# Patient Record
Sex: MALE | Race: WHITE | NOT HISPANIC OR LATINO | Employment: FULL TIME | URBAN - METROPOLITAN AREA
[De-identification: names, ages, dates, MRNs, and addresses within clinical notes are randomized per-mention and may not be internally consistent; named-entity substitution may affect disease eponyms.]

---

## 2022-12-20 ENCOUNTER — OFFICE VISIT (OUTPATIENT)
Dept: RHEUMATOLOGY | Facility: CLINIC | Age: 52
End: 2022-12-20
Payer: COMMERCIAL

## 2022-12-20 VITALS
HEIGHT: 70 IN | RESPIRATION RATE: 16 BRPM | OXYGEN SATURATION: 98 % | BODY MASS INDEX: 32.04 KG/M2 | SYSTOLIC BLOOD PRESSURE: 144 MMHG | HEART RATE: 73 BPM | WEIGHT: 223.81 LBS | TEMPERATURE: 98 F | DIASTOLIC BLOOD PRESSURE: 97 MMHG

## 2022-12-20 DIAGNOSIS — Z79.899 HIGH RISK MEDICATION USE: ICD-10-CM

## 2022-12-20 DIAGNOSIS — M79.605 PAIN IN BOTH LOWER EXTREMITIES: ICD-10-CM

## 2022-12-20 DIAGNOSIS — Z15.89 HLA B27 (HLA B27 POSITIVE): ICD-10-CM

## 2022-12-20 DIAGNOSIS — R03.0 ELEVATED BP WITHOUT DIAGNOSIS OF HYPERTENSION: ICD-10-CM

## 2022-12-20 DIAGNOSIS — M54.31 SCIATICA OF RIGHT SIDE: ICD-10-CM

## 2022-12-20 DIAGNOSIS — M47.816 OSTEOARTHRITIS OF LUMBAR SPINE, UNSPECIFIED SPINAL OSTEOARTHRITIS COMPLICATION STATUS: ICD-10-CM

## 2022-12-20 DIAGNOSIS — M45.7 ANKYLOSING SPONDYLITIS OF LUMBOSACRAL REGION: Primary | ICD-10-CM

## 2022-12-20 DIAGNOSIS — M79.604 PAIN IN BOTH LOWER EXTREMITIES: ICD-10-CM

## 2022-12-20 PROBLEM — I10 ESSENTIAL HYPERTENSION: Status: ACTIVE | Noted: 2019-02-07

## 2022-12-20 PROBLEM — D84.821 DRUG-INDUCED IMMUNODEFICIENCY: Status: ACTIVE | Noted: 2022-12-20

## 2022-12-20 PROBLEM — I10 ESSENTIAL HYPERTENSION: Status: RESOLVED | Noted: 2019-02-07 | Resolved: 2022-12-20

## 2022-12-20 PROCEDURE — 3008F PR BODY MASS INDEX (BMI) DOCUMENTED: ICD-10-PCS | Mod: CPTII,,, | Performed by: INTERNAL MEDICINE

## 2022-12-20 PROCEDURE — 1160F RVW MEDS BY RX/DR IN RCRD: CPT | Mod: CPTII,,, | Performed by: INTERNAL MEDICINE

## 2022-12-20 PROCEDURE — 99204 PR OFFICE/OUTPT VISIT, NEW, LEVL IV, 45-59 MIN: ICD-10-PCS | Mod: S$PBB,,, | Performed by: INTERNAL MEDICINE

## 2022-12-20 PROCEDURE — 99204 OFFICE O/P NEW MOD 45 MIN: CPT | Mod: S$PBB,,, | Performed by: INTERNAL MEDICINE

## 2022-12-20 PROCEDURE — 1159F PR MEDICATION LIST DOCUMENTED IN MEDICAL RECORD: ICD-10-PCS | Mod: CPTII,,, | Performed by: INTERNAL MEDICINE

## 2022-12-20 PROCEDURE — 3008F BODY MASS INDEX DOCD: CPT | Mod: CPTII,,, | Performed by: INTERNAL MEDICINE

## 2022-12-20 PROCEDURE — 3077F SYST BP >= 140 MM HG: CPT | Mod: CPTII,,, | Performed by: INTERNAL MEDICINE

## 2022-12-20 PROCEDURE — 3080F PR MOST RECENT DIASTOLIC BLOOD PRESSURE >= 90 MM HG: ICD-10-PCS | Mod: CPTII,,, | Performed by: INTERNAL MEDICINE

## 2022-12-20 PROCEDURE — 1160F PR REVIEW ALL MEDS BY PRESCRIBER/CLIN PHARMACIST DOCUMENTED: ICD-10-PCS | Mod: CPTII,,, | Performed by: INTERNAL MEDICINE

## 2022-12-20 PROCEDURE — 3077F PR MOST RECENT SYSTOLIC BLOOD PRESSURE >= 140 MM HG: ICD-10-PCS | Mod: CPTII,,, | Performed by: INTERNAL MEDICINE

## 2022-12-20 PROCEDURE — 1159F MED LIST DOCD IN RCRD: CPT | Mod: CPTII,,, | Performed by: INTERNAL MEDICINE

## 2022-12-20 PROCEDURE — 99204 OFFICE O/P NEW MOD 45 MIN: CPT | Mod: PBBFAC | Performed by: INTERNAL MEDICINE

## 2022-12-20 PROCEDURE — 3080F DIAST BP >= 90 MM HG: CPT | Mod: CPTII,,, | Performed by: INTERNAL MEDICINE

## 2022-12-20 RX ORDER — CEFDINIR 300 MG/1
300 CAPSULE ORAL 2 TIMES DAILY
COMMUNITY
Start: 2022-10-10 | End: 2022-12-20 | Stop reason: ALTCHOICE

## 2022-12-20 RX ORDER — GABAPENTIN 300 MG/1
600 CAPSULE ORAL DAILY
Qty: 60 CAPSULE | Refills: 6 | Status: SHIPPED | OUTPATIENT
Start: 2022-12-20 | End: 2023-10-09

## 2022-12-20 RX ORDER — KETOROLAC TROMETHAMINE 10 MG/1
10 TABLET, FILM COATED ORAL EVERY 6 HOURS PRN
COMMUNITY
Start: 2022-10-10 | End: 2022-12-20 | Stop reason: ALTCHOICE

## 2022-12-20 RX ORDER — ADALIMUMAB 40MG/0.4ML
40 KIT SUBCUTANEOUS
COMMUNITY
Start: 2022-11-23 | End: 2022-12-20 | Stop reason: SDUPTHER

## 2022-12-20 RX ORDER — MELOXICAM 15 MG/1
15 TABLET ORAL
COMMUNITY
Start: 2022-12-01 | End: 2022-12-20 | Stop reason: SDUPTHER

## 2022-12-20 RX ORDER — ADALIMUMAB 40MG/0.4ML
40 KIT SUBCUTANEOUS
Qty: 2 PEN | Refills: 6 | Status: SHIPPED | OUTPATIENT
Start: 2022-12-20 | End: 2023-06-20 | Stop reason: SDUPTHER

## 2022-12-20 RX ORDER — GABAPENTIN 300 MG/1
600 CAPSULE ORAL DAILY
COMMUNITY
Start: 2022-12-17 | End: 2022-12-20 | Stop reason: SDUPTHER

## 2022-12-20 RX ORDER — ADALIMUMAB 40MG/0.8ML
40 KIT SUBCUTANEOUS
COMMUNITY
Start: 2022-04-18 | End: 2022-12-20 | Stop reason: SDUPTHER

## 2022-12-20 RX ORDER — MELOXICAM 15 MG/1
15 TABLET ORAL DAILY
Qty: 30 TABLET | Refills: 1 | Status: SHIPPED | OUTPATIENT
Start: 2022-12-20 | End: 2023-01-19

## 2022-12-20 NOTE — PROGRESS NOTES
Patient ID: [id number]     Chief Complaint: Follow-up (Legs have still be hurting.Could be both or one rather than the other. Right hurts more usually./Had an MRI recently. )      HPI:     Keron Lott is a 52 y.o. male here today for a new patient visit for follow up of ankylosing spondylitis.     He has AS, non radiographic, doing well with out any issues on Humira. H/o symptoms of inflammatory back pain, improved with Humira.   C/o pain in legs and stiffness. Back pain is better. Admits sciatica symptoms on right. Gabapentin helps with the symptoms, he takes 600 mg at night.   Taking Humira and it continues to help with back pain.   He had ruptured disc and had fusion of L4-5 in 2013. He did well after that for some time, for last 2-3 years he started having back pain again. Steroid injections are not helping that much. Back pain associated with pain in bilateral legs, worse on right. Leg pain is recent and worse. He is a  and had to drive because of these symptoms.   Continues to have issues with sciatica and bilateral leg pain, mild improvement with SHLOMO to lower back done by Dr. Nroris, scheduled for another injection in January 2023.  He is planning to see Dr. Anatoliy hall for second opinion.  He got dehydrated during COVID infection in 9/2022, he LOC and received IV fluids in ER and felt better.     Admits oral ulcers every couple of months, never had it in nose. No ulcers today.  Autoimmune diseases in family: Father also have back issues. Cousins have back issues.  Denies fevers, rashes, photosensitivity, history of DVT or PE, history of stroke or seizures, history of Ml, history of inflammatory eye diseases, history of malignancy, Raynaud's phenomenon.  Smoking: life time non smoker.    Smoking:   Social History     Tobacco Use   Smoking Status Never   Smokeless Tobacco Never          No past medical history on file.     Past Surgical History:   Procedure Laterality Date    BACK SURGERY   2013    L4 and L5 fusion       Review of patient's allergies indicates:  No Known Allergies    Outpatient Medications Marked as Taking for the 12/20/22 encounter (Office Visit) with Anna Dukes MD   Medication Sig Dispense Refill    [DISCONTINUED] adalimumab (HUMIRA) 40 mg/0.8 mL SyKt injection Inject 40 mg into the skin.      [DISCONTINUED] gabapentin (NEURONTIN) 300 MG capsule Take 600 mg by mouth once daily.      [DISCONTINUED] meloxicam (MOBIC) 15 MG tablet Take 15 mg by mouth.         Social History     Socioeconomic History    Marital status:    Tobacco Use    Smoking status: Never    Smokeless tobacco: Never   Substance and Sexual Activity    Alcohol use: Yes     Comment: May be two beers a month        History reviewed. No pertinent family history.       There is no immunization history on file for this patient.    Patient Care Team:  Jonah Vences MD as PCP - General (Family Medicine)     Subjective:     ROS    Constitutional:  Denies chills. Denies fever. Denies night sweats. Denies weight loss.   Ophthalmology: Denies blurred vision. Denies dry eyes. Denies eye pain. Denies Itching and redness.   ENT: Denies oral ulcers. Denies epistaxis. Denies dry mouth. Denies swollen glands.   Endocrine: Denies diabetes. Denies thyroid Problems.   Respiratory: Denies cough. Denies shortness of breath. Denies shortness of breath with exertion. Denies hemoptysis.   Cardiovascular: Denies chest pain at rest. Denies chest pain with exertion. Denies palpitations.    Gastrointestinal: Denies abdominal pain. Denies diarrhea. Denies nausea. Denies vomiting. Denies hematemesis or hematochezia. Denies heartburn.  Genitourinary: Denies blood in urine.  Musculoskeletal: See HPI for details  Integumentary: Denies rash. Denies photosensitivity.   Peripheral Vascular: Denies Ulcers of hands and/or feet. Denies Cold extremities.   Neurologic: Denies dizziness. Denies headache.  Denies loss of strength. Denies numbness  "or tingling.   Psychiatric: Denies depression. Denies anxiety. Denies suicidal/homicidal ideations.      Objective:     BP (!) 144/97 (BP Location: Right arm, Patient Position: Sitting, BP Method: Medium (Automatic))   Pulse 73   Temp 97.8 °F (36.6 °C) (Oral)   Resp 16   Ht 5' 10" (1.778 m)   Wt 101.5 kg (223 lb 12.8 oz)   SpO2 98%   BMI 32.11 kg/m²     Physical Exam    General Appearance: alert, pleasant, in no acute distress.  Skin: Skin color, texture, turgor normal. No rashes or lesions.  Eyes:  extraocular movement intact (EOMI), pupils equal, round, reactive to light and accommodation, conjunctiva clear.  ENT: No oral or nasal ulcers.  Neck:  Neck supple. No adenopathy.   Lungs: CTA throughout without crackles, rhonchi, or wheezes.   Heart: RRR w/o murmurs.  No edema.  Abdomen: Soft, non-tender, no masses, rebound or guarding.  Neuro: Alert, oriented, CN II-XII GI, sensory and motor innervation intact.  Psych: Alert, oriented, normal eye contact.    Joint Exam:  DIPs, PIPs, MCPs: no pain on palpation, no swelling or redness, no nodules.  Wrists: no pain on palpation, no swelling or redness, good range of motion.  Elbows: no pain on palpation, no swelling or redness, good range of motion, no nodules.  Shoulders: no pain on palpation, no swelling or redness, good range of motion.  Back/Neck: no pain on palpation. Shonna's positive bilaterally in the past, modified schober's normal  Hips: no pain on palpation, good range of motion.  Knees: no pain on palpation, no swelling or redness, good range of motion.  Ankles: no pain on palpation, no swelling or redness, good range of motion.  Feet: no pain on palpation, no swelling or redness, no nodules.    Labs:     3/2020: HLAB27 positive. ESR, CRP normal. CBC ok. RF negative.  10/2020: Negative Hep B, Hep C, HIV and quantiferon tb.   8/12/22:  CBC, CMP okay.  GFR 69.  LFTs normal.  ESR, CRP normal.  Negative hepatitis-B, hepatitis-C, QuantiFERON TB.  No results " found for: WBC, HGB, HCT, PLT, ALT, AST, BUN, CREATININE, NA, K, CL, CO2, PROTEINUR, CRP, SEDRATE     Imagin: MRI of lumbar spine without contrast: Stable L4-L5 anterior and posterior fusion. Mild bulging at L5-S1 level.  10/2020: Chest X-ray normal. Normal X-ray of bilateral elbows, C spine, hips and SI joints. DJD of thoracic spine. Posterior hardware fixation at L4-5.     Assessment:       ICD-10-CM ICD-9-CM   1. Ankylosing spondylitis of lumbosacral region  M45.7 720.0   2. Osteoarthritis of lumbar spine, unspecified spinal osteoarthritis complication status  M47.816 721.3   3. Sciatica of right side  M54.31 724.3   4. High risk medication use  Z79.899 V58.69   5. Elevated BP without diagnosis of hypertension  R03.0 796.2   6. Pain in both lower extremities  M79.604 729.5    M79.605         Plan:     1. Ankylosing spondylitis of lumbosacral region: P/w features of inflammation back pain. Synovitis in bilateral elbows on exam, Shonna's test positive bilaterally. He has ankylosing spondylitis. Admits intermittent oral ulcers. Denies history of genital ulcers or nasal ulcers, inflammatory eye disease symptoms or inflammatory bowel disease symptoms.   - Non radiographic ankylosing spondylitis of lumbar spine.  HLAB27 positive. Tried and failed Ibuprofen and Naproxen. Failed Cimzia, severe sinus issues with it. Patient felt better on Taltz, but insurance did not approve it. Cosentyx-did no help at all.   - Doing well on Humira.  Back pain and back stiffness is much better.  Only taking meloxicam as needed.  - Labs today.     2. Osteoarthritis of lumbar spine: with steroid injection to lower back done by Dr. Norris, scheduled for another injection in 2023.  He is planning to see Dr. Anatoliy hall for second opinion.     3. Sciatica of right side: C/w stretches and exercises. See above.     4. High risk medication use:  - Persons with rheumatoid arthritis, lupus, psoriatic arthritis and other  autoimmune diseases are at increased risk of cardiovascular disease including heart attack and stroke. We recommend that all patients with these conditions have annual health maintenance exams including lipid measurements, blood pressure measurements, and smoking cessation counseling when applicable at their primary care provider's office.   -Advised to stay up-to-date on age appropriate vaccinations and malignancy screening.    - Uptodate on COVID vaccines. Refused flu vaccine today.     5. Elevated BP without diagnosis of hypertension: BP normal per patient at home, he will follow up with his PCP.     6. Low GFR with NSAID's: Advised to stay hydrated and avoid NSAID's. Will monitor.           Follow up in about 6 months (around 6/20/2023). In addition to their scheduled follow up, the patient has also been instructed to follow up on as needed basis.      Total time spent with patient and documentation is more than 50 minutes. All questions were answered to patient's satisfaction and patient verbalized understanding.

## 2022-12-21 ENCOUNTER — DOCUMENTATION ONLY (OUTPATIENT)
Dept: RHEUMATOLOGY | Facility: CLINIC | Age: 52
End: 2022-12-21
Payer: COMMERCIAL

## 2022-12-21 NOTE — PROGRESS NOTES
AmriksKeron JEFF  50 Y old Male, : 1970 Account Number: [id number]  [address] , [id number]  Home: [phone]  Guarantor: Oren Keron A Insurance: [employer] [facility]  PCP: Jonah Vences MD Referring: Archie Norris M.D. Appointment Facility[facility]  [place]    2020 Progress Notes: Anna Dukes MD      Current Medications  Taking  Meloxicam 15 MG Tablet 1 tablet Orally Once a day  Medication List reviewed and reconciled with the patient    Past Medical History  Medical History Verified..  Surgical History  back   Family History Father: alive Mother: alive  2 brother(s), 2 sister(s). 1 son(s) .  Social History  Tobacco Use:  Tobacco Use/Smoking Are you a nonsmoker  Drugs/Alcohol:  Do you drink alcohol?: Socially. Miscellaneous:  Caffeine: 4 cups a day of coffee/3 cokes aday.  Exercise: none. Housing: owns a home. Living with: spouse/son. Marital status: .  Occupation: Works full-time- .  Allergies  N.K.D.A.  Hospitalization/Major Diagnostic Procedure  Denies Past Hospitalization  Review of Systems  General/Constitutional:  Chills denies. Fatigue denies.  Fever denies. Night sweats denies. Sleep disturbance denies. Weight loss denies. Ophthalmologic:  Blurred vision denies. Change in  visual acuity denies. Dry eye denies. Eye Pain denies. Itching and redness denies. Red eye denies.    Reason for Appointment  1. pos HLA B27  History of Present Illness  Consult:  50 year old male presents with c/o Chief complaint positive HLA B27.  Referred by Dr. Archie Norris.  9.30.20. Rapid 3: 5.7.  He had ruptured disc and had fusion of L4-5 in 2013. He did well after that for some time, for last 2-3 years he started having back pain again. Steroid injections are not helping that much. Back pain associated with pain in bilateral legs, worse on right. Leg pain is recent and worse. He is a  and had to drive because of  these symptoms. Back pain first started even in his 20's too, got worse for last 1O years. Morning stiffness for 1-2 hours in back.  Good time for him is mid mornings. Some times wakes up with back pain.  Injections to sciatic joints had helped. Some times pain and stiffness in neck. No pain in other joints. Severe pain in back of thighs, stabbing pain.  Admits oral ulcers every couple of months, never had it in nose.  No ulcers today.  Autoimmune diseases in family: Father also have back issues.  Cousins have back issues.  Denies fevers, rashes, photosensitivity, history of DVT or PE, history of stroke or seizures, history of Ml, history of inflammatory eye diseases, history of malignancy, Raynaud's phenomenon.  Smoking: life time non smoker.  Vital Signs  Temp 98.1, HR 79 /min, /100 mm Hg, Repeat /98, Wt 212 lbs, BMI 31.30 Index, Ht 69 in, RR 20 /min, Ht-cm 175.26 cm,  Wt-kg 96.16 kg.  Examination  General Examination:  GENERAL APPEARANCE: alert, pleasant, in no acute distress    EYES: extraocular movement intact (EOMI), pupils equal, round, reactive to light and accommodation, conjunctiva clear.    ,!;;Lisa:  Oral ulcers denies. Epistaxis denies.  Swelling of ears or throat denies. Dry mouth denies. Swollen glands denies. Endocrine:  Diabetes denies. Thyroid problems denies.  Respiratory:  Cough denies. Hemoptysis denies. Shortness of breath denies. Shortness of breath with exertion denies.  Cardiovascular:  Chest pain denies. Chest pain with exertion denies. Fluid accumulation in the legs denies. Orthopnea denies.  Palpitations denies. Gastrointestinal:  Abdominal pain denies. Blood in  stool denies. Constipation denies. Decreased appetite denies.  Diarrhea denies. Difficulty  swallowing denies. Heartburn denies. Nausea denies. Vomiting denies.  Hematology:  Blood clots denies. Anemia denies.  Bleeding problems denies. Genitourinary:  Blood in urine denies.  MlJSClJ loskeletaI: As per HPI  "..  Peripheral Vascular:  Ulcers hands/feet denies. Cold extremities denies.  fill.o.:  Photo sensitivity denies. Rash denies.    Neurologic:  Difficulty speaking denies.  Dizziness denies. Headache denies. Loss of strength denies. Seizures denies. Stroke denies.  Tingling/Numbness denies. Psychiatric:  Patient denies anxiety, depressed  mood, suicidal thoughts.    Vnf"\L vf"\VI I I. VVt'clllll lllcl I"\, Ut'lllt' Ulcll clllU llcl cll Ul\.,;t'I .  NECK/THYROID: neck supple. SKIN: no rashes .  HEART: S1, S2 normal, regular rate and rhythm .  LUNGS: clear to auscultation bilaterally, no wheezes, rales, rhonchi.  ABDOMEN: soft, nontender, nondistended. EXTREMITIES: no clubbing, cyanosis, or edema. PERIPHERAL PULSES: 2+ posterior tibial.  NEUROLOGIC: alert and oriented, cranial nerves 2-12 grossly intact, motor strength normal upper and lower extremities, sensory exam intact.  PSYCH: alert, oriented, good eye contact.  Joint exam:  DIPs, PIPs, and MCPs no pain on palpation, no swelling or redness, no nodules.  WRISTS no pain on palpation, no swelling or redness, good range of motion .  ELBOWS synovial thickness in bilateral elbows, tednerness in left elbow.  SHOULDERS no pain on palpation, no swelling or redness, good range of motion .  BACK/NECK no pain on palpation. Shonna's positive bilaterally, modified schober's normal..  HIPS no pain on palpation, good range of motion.  KNEES no pain on palpation, no swelling or redness, good range of motion.  ANKLES no pain on palpation, no redness, good range of  motion.  FEET no pain on palpation, no redness, good range of motion.  Assessments  HLA-B27 positive - Z15.89 (Primary)  Inflammatory back pain - M54.89  Elbow pain, left - M25.522  Elbow pain, right - M25.521  Sciatic leg pain - M54.30  Elevated BP without diagnosis of hypertension - R03.0  Reviewed records from Dr. Archie Norris  Thank you for allowing me to participate in care of Mr. Keron Lott. 50-year-old pleasant "  gentleman here for evaluation of back pain and positive HLA-B27.  Inflammatory back pain: He has features of inflammation back pain. Synovitis in bilateral elbows on exam, Shonna's test positive bilaterally. He likely has ankylosing spondylitis. Admits intermittent oral ulcers. Denies history of genital ulcers or nasal ulcers, inflammatory eye disease symptoms or inflammatory bowel disease symptoms. We will check basic labs, x-ray SI joint, elbows, spine and hips. He has failed meloxicam, ibuprofen and naproxen. After test results and start him on Cimzia, discussed the side effects and benefits of medication with the patient. Continue with meloxicam for now.  Recommend doing physical therapy for chronic back and sciatica symptoms.  Elevated blood pressure without diagnosis of hypertension: Per patient blood pressure normal at home, blood pressure elevated during visits with specialists because of anxiety.  Educated to stay up to date on age appropriate vaccinations and cancer screenings.  3/2020: HLAB27 positive. ESR, CRP normal. CBC ok. RF negative. MRI of lumbar spine without contrast: Stable L4-L5 anterior and posterior fusion. Mild bulging at L5-S1 level.  Total time spent with patient is 70 minutes. More than 50% of time was spent in discussing diagnosis and treatment options. All questions were answered to patient's satisfaction and patient verbalized understanding.  Treatment  HLA-827 positive  Continue Meloxicam Tablet, 15 MG, 1 tablet, Orally, Once a day LAB: CBC w/ Auto Diff  Value Reference Range  RBC 4.84 4.10-5.90-  x10(6)/mcl  Abs Neut 4.1 1.5-6.9 -  x10(3)/mcl   Hct 44.2 39.0-50.0- %   Hgb 15.1 13.2-17.2 - gm/dl   Manual Diff? No    MCH 31.2 28.0-32.0 - pg   MCHC 34.2 33.0-36.0- gm/dl   MCV 91.3 78.0-100.0 - fl   MPV 9.7 7.4-10.4 - fl   Platelet 343 130-400 -  x10(3)/mcl   ROW 12.6 10.2-13.4 - %   WBC 6.5 4.0-10.0-  x10(3)/mcl   LAB: Comprehensive Metabolic Panel - CMP LAB: CRP  LAB: Hep B  "Core Ab Total[facility] LAB: Hep B Surface Ab QI[facility] LAB: Hep B Surface Ag w/Rflx-[facility]  LAB: Hep C Ab w/Rflx HCV RNA On PCR-[facility]472 LAB: Ouantiferon TB Gold (DSl)-[facility]73  LAB: Sedimentation Rate LAB: HIV1/2Ag/Ab4G Rfx-Q  IMAGING: X ray: SI joint, left IMAGING: X ray: SI joint, right IMAGING: XR Chest 2 Views IMAGING: XR Elbow Left 2 Views IMAGING: XR Elbow RiQht 2 Views IMAGING: XR Hip Left 2 Views IMAGING: XR Hip Right 2 Views    11v1A1.,a11\11.,a: AM  ;;;ip1ne [id number] 4  views IMAGING: XR Spine Lumbar 2 or 3 Views IMAGING: XR Spine Thoracic 2 Views  Notes: The patient has symptoms consistent with a spondyloarthropathy. Discussed clinical features of spondyloarthropathies including ankylosing spondylitis, reactive arthritis, inflammatory bowel disease-related arthritis, psoriatic arthritis. Discussed how these diseases are considered an inflammatory arthritis. These diseases have a predilection towards causing inflammation in the lower back along with other peripheral joints. Other symptoms can include eye problems, tendon problems, and other various autoimmune features. Treatment of spondyloarthropathies include immunosuppressants.      Discussed the use of anti-TNF agents in inflammatory autoimmune disease. TNF-alpha promotes the inflammatory response, which in turn causes many of the clinical problems associated with autoimmune disorders such as rheumatoid arthritis, ankylosing spondylitis, Crohn"s disease, psoriasis, hidradenitis suppurativa and refractory asthma. Inhibition of TNF-alpha can decrease inflammation and in turn stop or decrease joint damage. This inhibition of TNF-alpha can be achieved with a monoclonal antibody such as infliximab (Remicade), adalimumab (Humira), certolizumab pegol (Cimz ia), and golimumab (Simponi), or with a circulating receptor fusion protein such as etanercept (Enbrel). These agents can be used as mono-therapy or in combination with other " immunosuppressants. Side effects include infection, especially opportunistic infections such as tuberculosis, fungal infections and certain types of bacterial infections. , We discussed the need for prompt treatment of infections and risk of death if infections were left untreated. If the patient should have an infection that requires antibiotics, then in general, they should hold the biologic TNF antagonist until the antibiotics are finished and there is a clear tendency to resolution of the infection. lmmunosuppression, in general, is known to be associated with an increase risk of malignancy, particularly squamous cell cancers. Likewise, we recommend annual skin exams by dermatologist and all other age appropriate cancer screening tests. We discussed this at length and the patient verbalized an understanding.    Others  Notes: Discussed precautions about the COVID 19 virus and advised adherence to CDCs/governmental guidelines. Encouraged patient to continue to avoid large crowds, quarantine based on present guidelines, avoid people who are sick, wash hands frequently and avoid touching face and/or eyes, along with wearing a mask. Encouraged to continue present medication regimen unless otherwise notified. Encouraged to report fevers, or s/s of illness to PCP, ourselves, or advised to seek out testing/further medical evaluation wherever available. Discussed if noted severe or significant symptoms especially with persistent fevers, cough, or

## 2023-01-05 ENCOUNTER — DOCUMENTATION ONLY (OUTPATIENT)
Dept: RHEUMATOLOGY | Facility: CLINIC | Age: 53
End: 2023-01-05
Payer: COMMERCIAL

## 2023-06-20 ENCOUNTER — OFFICE VISIT (OUTPATIENT)
Dept: RHEUMATOLOGY | Facility: CLINIC | Age: 53
End: 2023-06-20
Payer: COMMERCIAL

## 2023-06-20 VITALS
HEIGHT: 69 IN | TEMPERATURE: 98 F | RESPIRATION RATE: 20 BRPM | OXYGEN SATURATION: 98 % | SYSTOLIC BLOOD PRESSURE: 111 MMHG | HEART RATE: 81 BPM | BODY MASS INDEX: 33.42 KG/M2 | WEIGHT: 225.63 LBS | DIASTOLIC BLOOD PRESSURE: 77 MMHG

## 2023-06-20 DIAGNOSIS — M54.31 SCIATICA OF RIGHT SIDE: ICD-10-CM

## 2023-06-20 DIAGNOSIS — R03.0 ELEVATED BP WITHOUT DIAGNOSIS OF HYPERTENSION: ICD-10-CM

## 2023-06-20 DIAGNOSIS — M79.605 PAIN IN BOTH LOWER EXTREMITIES: ICD-10-CM

## 2023-06-20 DIAGNOSIS — M79.604 PAIN IN BOTH LOWER EXTREMITIES: ICD-10-CM

## 2023-06-20 DIAGNOSIS — M45.7 ANKYLOSING SPONDYLITIS OF LUMBOSACRAL REGION: Primary | ICD-10-CM

## 2023-06-20 DIAGNOSIS — M47.816 OSTEOARTHRITIS OF LUMBAR SPINE, UNSPECIFIED SPINAL OSTEOARTHRITIS COMPLICATION STATUS: ICD-10-CM

## 2023-06-20 DIAGNOSIS — Z79.899 HIGH RISK MEDICATION USE: ICD-10-CM

## 2023-06-20 DIAGNOSIS — Z15.89 HLA B27 (HLA B27 POSITIVE): ICD-10-CM

## 2023-06-20 PROCEDURE — 3074F SYST BP LT 130 MM HG: CPT | Mod: CPTII,,, | Performed by: INTERNAL MEDICINE

## 2023-06-20 PROCEDURE — 1159F MED LIST DOCD IN RCRD: CPT | Mod: CPTII,,, | Performed by: INTERNAL MEDICINE

## 2023-06-20 PROCEDURE — 3008F BODY MASS INDEX DOCD: CPT | Mod: CPTII,,, | Performed by: INTERNAL MEDICINE

## 2023-06-20 PROCEDURE — 3078F PR MOST RECENT DIASTOLIC BLOOD PRESSURE < 80 MM HG: ICD-10-PCS | Mod: CPTII,,, | Performed by: INTERNAL MEDICINE

## 2023-06-20 PROCEDURE — 99214 OFFICE O/P EST MOD 30 MIN: CPT | Mod: PBBFAC | Performed by: INTERNAL MEDICINE

## 2023-06-20 PROCEDURE — 1159F PR MEDICATION LIST DOCUMENTED IN MEDICAL RECORD: ICD-10-PCS | Mod: CPTII,,, | Performed by: INTERNAL MEDICINE

## 2023-06-20 PROCEDURE — 3008F PR BODY MASS INDEX (BMI) DOCUMENTED: ICD-10-PCS | Mod: CPTII,,, | Performed by: INTERNAL MEDICINE

## 2023-06-20 PROCEDURE — 99214 OFFICE O/P EST MOD 30 MIN: CPT | Mod: S$PBB,,, | Performed by: INTERNAL MEDICINE

## 2023-06-20 PROCEDURE — 99214 PR OFFICE/OUTPT VISIT, EST, LEVL IV, 30-39 MIN: ICD-10-PCS | Mod: S$PBB,,, | Performed by: INTERNAL MEDICINE

## 2023-06-20 PROCEDURE — 3074F PR MOST RECENT SYSTOLIC BLOOD PRESSURE < 130 MM HG: ICD-10-PCS | Mod: CPTII,,, | Performed by: INTERNAL MEDICINE

## 2023-06-20 PROCEDURE — 3078F DIAST BP <80 MM HG: CPT | Mod: CPTII,,, | Performed by: INTERNAL MEDICINE

## 2023-06-20 RX ORDER — MELOXICAM 7.5 MG/1
15 TABLET ORAL DAILY
COMMUNITY
End: 2023-08-07 | Stop reason: SDUPTHER

## 2023-06-20 RX ORDER — ADALIMUMAB 40MG/0.4ML
40 KIT SUBCUTANEOUS
Qty: 2 PEN | Refills: 6 | Status: SHIPPED | OUTPATIENT
Start: 2023-06-20 | End: 2024-01-17

## 2023-06-20 NOTE — PROGRESS NOTES
Patient ID: [id number]     Chief Complaint: [facility]W Follow Up Visit (Pt states he is feeling okay today but is still having pain in legs. )      HPI:     Keron Lott is a 53 y.o. male here today for follow up of ankylosing spondylitis.     He has AS, non radiographic, doing well with out any issues on Humira. H/o symptoms of inflammatory back pain, improved with Humira.   C/o pain in legs and stiffness. Back pain is better. Admits sciatica symptoms on right and numbness in left leg. Gabapentin helps with the symptoms, he takes 600 mg at night.   Taking Humira and it continues to help with back pain.   He had ruptured disc and had fusion of L4-5 in 2013. He did well after that for some time, for last 2-3 years he started having back pain again. Steroid injections are not helping that much. Back pain associated with pain in bilateral legs, worse on right. Leg pain is recent and worse. He is a  and had to drive because of these symptoms.   Continues to have issues with sciatica and bilateral leg pain, mild improvement with SHLOMO to lower back done by Dr. Norris.  He saw Dr. Anatoliy hall, repeat back injections did not help, was told L5-S1 disc is bulging on nerves, he is planning to see Dr Robbin Rodas in [place] soon, to see if he is a candidate for surgery again.     Admits oral ulcers every couple of months, never had it in nose. No ulcers today.  Autoimmune diseases in family: Father also have back issues. Cousins have back issues.  Denies fevers, rashes, photosensitivity, history of DVT or PE, history of stroke or seizures, history of Ml, history of inflammatory eye diseases, history of malignancy, Raynaud's phenomenon.  Smoking: life time non smoker.       Social History     Tobacco Use   Smoking Status Never   Smokeless Tobacco Former          No past medical history on file.     Past Surgical History:   Procedure Laterality Date    BACK SURGERY  2013    L4 and L5 fusion       Review of  patient's allergies indicates:  No Known Allergies    No outpatient medications have been marked as taking for the 6/20/23 encounter (Office Visit) with Anna Dukes MD.       Social History     Socioeconomic History    Marital status:    Tobacco Use    Smoking status: Never    Smokeless tobacco: Former   Substance and Sexual Activity    Alcohol use: Yes     Comment: May be two beers a month    Drug use: Never    Sexual activity: Yes        Family History   Problem Relation Age of Onset    Heart disease Father     Hypertension Father           There is no immunization history on file for this patient.    Patient Care Team:  Jonah Vences MD as PCP - General (Family Medicine)     Subjective:     ROS    Constitutional:  Denies chills. Denies fever. Denies night sweats. Denies weight loss.   Ophthalmology: Denies blurred vision. Denies dry eyes. Denies eye pain. Denies Itching and redness.   ENT: Denies oral ulcers. Denies epistaxis. Denies dry mouth. Denies swollen glands.   Endocrine: Denies diabetes. Denies thyroid Problems.   Respiratory: Denies cough. Denies shortness of breath. Denies shortness of breath with exertion. Denies hemoptysis.   Cardiovascular: Denies chest pain at rest. Denies chest pain with exertion. Denies palpitations.    Gastrointestinal: Denies abdominal pain. Denies diarrhea. Denies nausea. Denies vomiting. Denies hematemesis or hematochezia. Denies heartburn.  Genitourinary: Denies blood in urine.  Musculoskeletal: See HPI for details  Integumentary: Denies rash. Denies photosensitivity.   Peripheral Vascular: Denies Ulcers of hands and/or feet. Denies Cold extremities.   Neurologic: Denies dizziness. Denies headache.  Denies loss of strength. Denies numbness or tingling.   Psychiatric: Denies depression. Denies anxiety. Denies suicidal/homicidal ideations.      Objective:     /77 (BP Location: Right arm, Patient Position: Sitting, BP Method: Large (Automatic))   Pulse 81   " Temp 98.2 °F (36.8 °C) (Oral)   Resp 20   Ht 5' 9" (1.753 m)   Wt 102.3 kg (225 lb 9.6 oz)   SpO2 98%   BMI 33.32 kg/m²     Physical Exam    General Appearance: alert, pleasant, in no acute distress.  Skin: Skin color, texture, turgor normal. No rashes or lesions.  Eyes:  extraocular movement intact (EOMI), pupils equal, round, reactive to light and accommodation, conjunctiva clear.  ENT: No oral or nasal ulcers.  Neck:  Neck supple. No adenopathy.   Lungs: CTA throughout without crackles, rhonchi, or wheezes.   Heart: RRR w/o murmurs.  No edema.  Abdomen: Soft, non-tender, no masses, rebound or guarding.  Neuro: Alert, oriented, CN II-XII GI, sensory and motor innervation intact.  Psych: Alert, oriented, normal eye contact.    Joint Exam:  DIPs, PIPs, MCPs: no pain on palpation, no swelling or redness, no nodules.  Wrists: no pain on palpation, no swelling or redness, good range of motion.  Elbows: no pain on palpation, no swelling or redness, good range of motion, no nodules.  Shoulders: no pain on palpation, no swelling or redness, good range of motion.  Back/Neck: no pain on palpation. Shonna's positive bilaterally in the past, modified schober's normal  Ankles: no pain on palpation, no swelling or redness, good range of motion.  Feet: no pain on palpation, no swelling or redness, no nodules.    Labs:     3/2020: HLAB27 positive. ESR, CRP normal. CBC ok. RF negative.  10/2020: Negative Hep B, Hep C, HIV and quantiferon tb.   8/12/22:  CBC, CMP okay.  GFR 69.  LFTs normal.  ESR, CRP normal.  Negative hepatitis-B, hepatitis-C, QuantiFERON TB.  12/20/22:  CBC, CMP okay.  ESR, CRP normal.    Lab Results   Component Value Date    WBC 6.3 12/20/2022    HGB 14.6 12/20/2022    HCT 42.9 12/20/2022     12/20/2022    ALT 48 12/20/2022    AST 27 12/20/2022    BUN 15.7 12/20/2022    CREATININE 1.11 12/20/2022     12/20/2022    K 4.0 12/20/2022    CO2 27 12/20/2022    CRP 3.20 12/20/2022    SEDRATE 3 " 2022        Imagin: MRI of lumbar spine without contrast: Stable L4-L5 anterior and posterior fusion. Mild bulging at L5-S1 level.  10/2020: Chest X-ray normal. Normal X-ray of bilateral elbows, C spine, hips and SI joints. DJD of thoracic spine. Posterior hardware fixation at L4-5.     Assessment:       ICD-10-CM ICD-9-CM   1. Ankylosing spondylitis of lumbosacral region  M45.7 720.0   2. HLA B27 (HLA B27 positive)  Z15.89 V84.89   3. Osteoarthritis of lumbar spine, unspecified spinal osteoarthritis complication status  M47.816 721.3   4. Sciatica of right side  M54.31 724.3   5. High risk medication use  Z79.899 V58.69   6. Elevated BP without diagnosis of hypertension  R03.0 796.2   7. Pain in both lower extremities  M79.604 729.5    M79.605           Plan:     1. Ankylosing spondylitis of lumbosacral region: P/w features of inflammation back pain. Synovitis in bilateral elbows on exam, Shonna's test positive bilaterally. He has ankylosing spondylitis. Admits intermittent oral ulcers. Denies history of genital ulcers or nasal ulcers, inflammatory eye disease symptoms or inflammatory bowel disease symptoms.   - Non radiographic ankylosing spondylitis of lumbar spine. HLAB27 positive. Tried and failed Ibuprofen and Naproxen. Failed Cimzia, severe sinus issues with it. Patient felt better on Taltz, but insurance did not approve it. Cosentyx-did not help at all.   - Doing well on Humira.  Back pain and back stiffness is much better.  Only taking meloxicam as needed.  - Labs today.     2. Osteoarthritis of lumbar spine: He had seen Dr. Norris in the past. He saw Dr. Anatoliy hall, repeat back injections did not help, was told L5-S1 disc is bulging on nerves, he is planning to see Dr Robbin Rodas in [place] soon, to see if he is a candidate for surgery again.      3. Sciatica of right side: C/w stretches and exercises.      4. High risk medication use:  - Persons with rheumatoid arthritis,  lupus, psoriatic arthritis and other autoimmune diseases are at increased risk of cardiovascular disease including heart attack and stroke. We recommend that all patients with these conditions have annual health maintenance exams including lipid measurements, blood pressure measurements, and smoking cessation counseling when applicable at their primary care provider's office.   -Advised to stay up-to-date on age appropriate vaccinations and malignancy screening.    - Uptodate on COVID vaccines. Refused pneumonia vaccine today, he will think about it. Refused flu vaccine in the past.     5. Elevated BP without diagnosis of hypertension: BP normal per patient at home, he will follow up with his PCP.     6. Low GFR with NSAID's: Advised to stay hydrated and avoid NSAID's. Will monitor.           Follow up in about 6 months (around 12/20/2023). In addition to their scheduled follow up, the patient has also been instructed to follow up on as needed basis.      Total time spent with patient and documentation is more than 30 minutes. All questions were answered to patient's satisfaction and patient verbalized understanding.

## 2023-08-08 RX ORDER — MELOXICAM 7.5 MG/1
15 TABLET ORAL DAILY
Qty: 30 TABLET | Refills: 0 | Status: SHIPPED | OUTPATIENT
Start: 2023-08-08

## 2023-10-09 DIAGNOSIS — M45.7 ANKYLOSING SPONDYLITIS OF LUMBOSACRAL REGION: Primary | ICD-10-CM

## 2023-10-09 RX ORDER — GABAPENTIN 300 MG/1
600 CAPSULE ORAL DAILY
Qty: 60 CAPSULE | Refills: 6 | Status: SHIPPED | OUTPATIENT
Start: 2023-10-09 | End: 2024-03-11

## 2024-01-16 DIAGNOSIS — M45.7 ANKYLOSING SPONDYLITIS OF LUMBOSACRAL REGION: ICD-10-CM

## 2024-01-16 DIAGNOSIS — M45.7 ANKYLOSING SPONDYLITIS OF LUMBOSACRAL REGION: Primary | ICD-10-CM

## 2024-01-16 DIAGNOSIS — M47.816 OSTEOARTHRITIS OF LUMBAR SPINE, UNSPECIFIED SPINAL OSTEOARTHRITIS COMPLICATION STATUS: Primary | ICD-10-CM

## 2024-01-17 ENCOUNTER — LAB VISIT (OUTPATIENT)
Dept: LAB | Facility: HOSPITAL | Age: 54
End: 2024-01-17
Attending: INTERNAL MEDICINE
Payer: COMMERCIAL

## 2024-01-17 DIAGNOSIS — M45.7 ANKYLOSING SPONDYLITIS OF LUMBOSACRAL REGION: ICD-10-CM

## 2024-01-17 DIAGNOSIS — M47.816 OSTEOARTHRITIS OF LUMBAR SPINE, UNSPECIFIED SPINAL OSTEOARTHRITIS COMPLICATION STATUS: ICD-10-CM

## 2024-01-17 LAB
ALBUMIN SERPL-MCNC: 3.9 G/DL (ref 3.5–5)
ALBUMIN/GLOB SERPL: 1.5 RATIO (ref 1.1–2)
ALP SERPL-CCNC: 63 UNIT/L (ref 40–150)
ALT SERPL-CCNC: 29 UNIT/L (ref 0–55)
AST SERPL-CCNC: 17 UNIT/L (ref 5–34)
BASOPHILS # BLD AUTO: 0.06 X10(3)/MCL
BASOPHILS NFR BLD AUTO: 0.8 %
BILIRUB SERPL-MCNC: 0.7 MG/DL
BUN SERPL-MCNC: 16.6 MG/DL (ref 8.4–25.7)
CALCIUM SERPL-MCNC: 9.3 MG/DL (ref 8.4–10.2)
CHLORIDE SERPL-SCNC: 107 MMOL/L (ref 98–107)
CO2 SERPL-SCNC: 28 MMOL/L (ref 22–29)
CREAT SERPL-MCNC: 1.29 MG/DL (ref 0.73–1.18)
EOSINOPHIL # BLD AUTO: 0.03 X10(3)/MCL (ref 0–0.9)
EOSINOPHIL NFR BLD AUTO: 0.4 %
ERYTHROCYTE [DISTWIDTH] IN BLOOD BY AUTOMATED COUNT: 13 % (ref 11.5–17)
GFR SERPLBLD CREATININE-BSD FMLA CKD-EPI: >60 MLS/MIN/1.73/M2
GLOBULIN SER-MCNC: 2.6 GM/DL (ref 2.4–3.5)
GLUCOSE SERPL-MCNC: 85 MG/DL (ref 74–100)
HCT VFR BLD AUTO: 41.8 % (ref 42–52)
HGB BLD-MCNC: 14.3 G/DL (ref 14–18)
IMM GRANULOCYTES # BLD AUTO: 0.03 X10(3)/MCL (ref 0–0.04)
IMM GRANULOCYTES NFR BLD AUTO: 0.4 %
LYMPHOCYTES # BLD AUTO: 4.01 X10(3)/MCL (ref 0.6–4.6)
LYMPHOCYTES NFR BLD AUTO: 51 %
MCH RBC QN AUTO: 30.8 PG (ref 27–31)
MCHC RBC AUTO-ENTMCNC: 34.2 G/DL (ref 33–36)
MCV RBC AUTO: 90.1 FL (ref 80–94)
MONOCYTES # BLD AUTO: 0.6 X10(3)/MCL (ref 0.1–1.3)
MONOCYTES NFR BLD AUTO: 7.6 %
NEUTROPHILS # BLD AUTO: 3.14 X10(3)/MCL (ref 2.1–9.2)
NEUTROPHILS NFR BLD AUTO: 39.8 %
NRBC BLD AUTO-RTO: 0 %
PLATELET # BLD AUTO: 343 X10(3)/MCL (ref 130–400)
PMV BLD AUTO: 9.5 FL (ref 7.4–10.4)
POTASSIUM SERPL-SCNC: 4.4 MMOL/L (ref 3.5–5.1)
PROT SERPL-MCNC: 6.5 GM/DL (ref 6.4–8.3)
RBC # BLD AUTO: 4.64 X10(6)/MCL (ref 4.7–6.1)
SODIUM SERPL-SCNC: 143 MMOL/L (ref 136–145)
WBC # SPEC AUTO: 7.87 X10(3)/MCL (ref 4.5–11.5)

## 2024-01-17 PROCEDURE — 36415 COLL VENOUS BLD VENIPUNCTURE: CPT

## 2024-01-17 PROCEDURE — 80053 COMPREHEN METABOLIC PANEL: CPT

## 2024-01-17 PROCEDURE — 85025 COMPLETE CBC W/AUTO DIFF WBC: CPT

## 2024-01-17 RX ORDER — ADALIMUMAB 40MG/0.4ML
KIT SUBCUTANEOUS
Qty: 2 PEN | Refills: 6 | Status: SHIPPED | OUTPATIENT
Start: 2024-01-17 | End: 2024-02-20 | Stop reason: SDUPTHER

## 2024-01-17 NOTE — PROGRESS NOTES
Please let him know kidney function slightly lower than before, asked him to stay hydrated and decrease the use of meloxicam or other NSAIDs.  Ask him to take meloxicam as needed and try not to take it daily.

## 2024-02-16 ENCOUNTER — TELEPHONE (OUTPATIENT)
Dept: RHEUMATOLOGY | Facility: CLINIC | Age: 54
End: 2024-02-16
Payer: COMMERCIAL

## 2024-02-16 NOTE — TELEPHONE ENCOUNTER
Received PA request for Humira CF pen 40mg/0.4ml through cover my meds key: [id number] submitted to [facility]

## 2024-02-19 NOTE — TELEPHONE ENCOUNTER
Your information has been submitted to [facility]. To check for an updated outcome later, reopen this PA request from your dashboard.  If [facility] has not responded to your request within 24 hours, contact [facility] at [phone]. If you think there may be a problem with your PA request, use our live chat feature at the bottom right.

## 2024-02-19 NOTE — TELEPHONE ENCOUNTER
Humira 40mg PA approved from 2/19/24 to 2/19/25. Scanned in media. Faxed to approval to [facility] in [place].  And scanned fax confirmation. Called patient attempting to notify of humira approval but voicemail box is not set up.    Then called and spoke with patient to notify of prior authorization approval.Verbalized understanding.

## 2024-02-20 DIAGNOSIS — M45.7 ANKYLOSING SPONDYLITIS OF LUMBOSACRAL REGION: ICD-10-CM

## 2024-02-20 NOTE — TELEPHONE ENCOUNTER
Received fax from [facility] notifying this clinic that Humira Pen will not be covered by insurance and their new preferred is biosimilar humira- HyrimozPen. Dr. Dukes would like to send medication to [facility] pharm instead. Pended Humira and changed pharm.  Called patient to notify of pharmacy change.

## 2024-02-21 RX ORDER — ADALIMUMAB 40MG/0.4ML
40 KIT SUBCUTANEOUS
Qty: 2 PEN | Refills: 6 | Status: SHIPPED | OUTPATIENT
Start: 2024-02-21 | End: 2024-03-11 | Stop reason: ALTCHOICE

## 2024-03-11 ENCOUNTER — OFFICE VISIT (OUTPATIENT)
Dept: RHEUMATOLOGY | Facility: CLINIC | Age: 54
End: 2024-03-11
Payer: COMMERCIAL

## 2024-03-11 ENCOUNTER — TELEPHONE (OUTPATIENT)
Dept: RHEUMATOLOGY | Facility: CLINIC | Age: 54
End: 2024-03-11
Payer: COMMERCIAL

## 2024-03-11 ENCOUNTER — LAB VISIT (OUTPATIENT)
Dept: LAB | Facility: HOSPITAL | Age: 54
End: 2024-03-11
Attending: INTERNAL MEDICINE
Payer: COMMERCIAL

## 2024-03-11 VITALS
BODY MASS INDEX: 32.26 KG/M2 | TEMPERATURE: 98 F | HEART RATE: 76 BPM | WEIGHT: 217.81 LBS | SYSTOLIC BLOOD PRESSURE: 127 MMHG | DIASTOLIC BLOOD PRESSURE: 92 MMHG | HEIGHT: 69 IN | OXYGEN SATURATION: 96 % | RESPIRATION RATE: 18 BRPM

## 2024-03-11 DIAGNOSIS — Z23 NEED FOR SHINGLES VACCINE: ICD-10-CM

## 2024-03-11 DIAGNOSIS — M47.816 OSTEOARTHRITIS OF LUMBAR SPINE, UNSPECIFIED SPINAL OSTEOARTHRITIS COMPLICATION STATUS: ICD-10-CM

## 2024-03-11 DIAGNOSIS — Z79.899 DRUG-INDUCED IMMUNODEFICIENCY: ICD-10-CM

## 2024-03-11 DIAGNOSIS — M79.604 PAIN IN BOTH LOWER EXTREMITIES: ICD-10-CM

## 2024-03-11 DIAGNOSIS — Z15.89 HLA B27 (HLA B27 POSITIVE): ICD-10-CM

## 2024-03-11 DIAGNOSIS — M19.90 INFLAMMATORY ARTHRITIS: ICD-10-CM

## 2024-03-11 DIAGNOSIS — M79.605 PAIN IN BOTH LOWER EXTREMITIES: ICD-10-CM

## 2024-03-11 DIAGNOSIS — D84.821 DRUG-INDUCED IMMUNODEFICIENCY: ICD-10-CM

## 2024-03-11 DIAGNOSIS — M45.7 ANKYLOSING SPONDYLITIS OF LUMBOSACRAL REGION: Primary | ICD-10-CM

## 2024-03-11 LAB
ALBUMIN SERPL-MCNC: 4.4 G/DL (ref 3.5–5)
ALBUMIN/GLOB SERPL: 1.5 RATIO (ref 1.1–2)
ALP SERPL-CCNC: 64 UNIT/L (ref 40–150)
ALT SERPL-CCNC: 22 UNIT/L (ref 0–55)
AST SERPL-CCNC: 18 UNIT/L (ref 5–34)
BASOPHILS # BLD AUTO: 0.03 X10(3)/MCL
BASOPHILS NFR BLD AUTO: 0.5 %
BILIRUB SERPL-MCNC: 0.9 MG/DL
BUN SERPL-MCNC: 16.4 MG/DL (ref 8.4–25.7)
CALCIUM SERPL-MCNC: 9.7 MG/DL (ref 8.4–10.2)
CHLORIDE SERPL-SCNC: 107 MMOL/L (ref 98–107)
CO2 SERPL-SCNC: 29 MMOL/L (ref 22–29)
CREAT SERPL-MCNC: 1.11 MG/DL (ref 0.73–1.18)
EOSINOPHIL # BLD AUTO: 0.04 X10(3)/MCL (ref 0–0.9)
EOSINOPHIL NFR BLD AUTO: 0.6 %
ERYTHROCYTE [DISTWIDTH] IN BLOOD BY AUTOMATED COUNT: 12.2 % (ref 11.5–17)
GFR SERPLBLD CREATININE-BSD FMLA CKD-EPI: >60 MLS/MIN/1.73/M2
GLOBULIN SER-MCNC: 3 GM/DL (ref 2.4–3.5)
GLUCOSE SERPL-MCNC: 101 MG/DL (ref 74–100)
HBV CORE AB SERPL QL IA: NONREACTIVE
HBV SURFACE AB SER-ACNC: 0.32 MIU/ML
HBV SURFACE AB SERPL IA-ACNC: NONREACTIVE M[IU]/ML
HBV SURFACE AG SERPL QL IA: NONREACTIVE
HCT VFR BLD AUTO: 44.1 % (ref 42–52)
HCV AB SERPL QL IA: NONREACTIVE
HGB BLD-MCNC: 15.4 G/DL (ref 14–18)
IMM GRANULOCYTES # BLD AUTO: 0.03 X10(3)/MCL (ref 0–0.04)
IMM GRANULOCYTES NFR BLD AUTO: 0.5 %
LYMPHOCYTES # BLD AUTO: 1.74 X10(3)/MCL (ref 0.6–4.6)
LYMPHOCYTES NFR BLD AUTO: 27.9 %
MCH RBC QN AUTO: 31.2 PG (ref 27–31)
MCHC RBC AUTO-ENTMCNC: 34.9 G/DL (ref 33–36)
MCV RBC AUTO: 89.3 FL (ref 80–94)
MONOCYTES # BLD AUTO: 0.45 X10(3)/MCL (ref 0.1–1.3)
MONOCYTES NFR BLD AUTO: 7.2 %
NEUTROPHILS # BLD AUTO: 3.94 X10(3)/MCL (ref 2.1–9.2)
NEUTROPHILS NFR BLD AUTO: 63.3 %
NRBC BLD AUTO-RTO: 0 %
PLATELET # BLD AUTO: 326 X10(3)/MCL (ref 130–400)
PMV BLD AUTO: 9.3 FL (ref 7.4–10.4)
POTASSIUM SERPL-SCNC: 4.7 MMOL/L (ref 3.5–5.1)
PROT SERPL-MCNC: 7.4 GM/DL (ref 6.4–8.3)
RBC # BLD AUTO: 4.94 X10(6)/MCL (ref 4.7–6.1)
SODIUM SERPL-SCNC: 140 MMOL/L (ref 136–145)
WBC # SPEC AUTO: 6.23 X10(3)/MCL (ref 4.5–11.5)

## 2024-03-11 PROCEDURE — 80053 COMPREHEN METABOLIC PANEL: CPT

## 2024-03-11 PROCEDURE — 3008F BODY MASS INDEX DOCD: CPT | Mod: CPTII,,, | Performed by: INTERNAL MEDICINE

## 2024-03-11 PROCEDURE — 99214 OFFICE O/P EST MOD 30 MIN: CPT | Mod: PBBFAC,25 | Performed by: INTERNAL MEDICINE

## 2024-03-11 PROCEDURE — 86704 HEP B CORE ANTIBODY TOTAL: CPT

## 2024-03-11 PROCEDURE — 86803 HEPATITIS C AB TEST: CPT

## 2024-03-11 PROCEDURE — 86706 HEP B SURFACE ANTIBODY: CPT

## 2024-03-11 PROCEDURE — 1159F MED LIST DOCD IN RCRD: CPT | Mod: CPTII,,, | Performed by: INTERNAL MEDICINE

## 2024-03-11 PROCEDURE — 36415 COLL VENOUS BLD VENIPUNCTURE: CPT

## 2024-03-11 PROCEDURE — 86480 TB TEST CELL IMMUN MEASURE: CPT

## 2024-03-11 PROCEDURE — 85025 COMPLETE CBC W/AUTO DIFF WBC: CPT

## 2024-03-11 PROCEDURE — 87340 HEPATITIS B SURFACE AG IA: CPT

## 2024-03-11 PROCEDURE — 99215 OFFICE O/P EST HI 40 MIN: CPT | Mod: S$PBB,,, | Performed by: INTERNAL MEDICINE

## 2024-03-11 PROCEDURE — 3080F DIAST BP >= 90 MM HG: CPT | Mod: CPTII,,, | Performed by: INTERNAL MEDICINE

## 2024-03-11 PROCEDURE — 3074F SYST BP LT 130 MM HG: CPT | Mod: CPTII,,, | Performed by: INTERNAL MEDICINE

## 2024-03-11 PROCEDURE — 90750 HZV VACC RECOMBINANT IM: CPT | Mod: PBBFAC

## 2024-03-11 RX ORDER — UPADACITINIB 15 MG/1
15 TABLET, EXTENDED RELEASE ORAL DAILY
Qty: 30 TABLET | Refills: 11 | Status: ACTIVE | OUTPATIENT
Start: 2024-03-11

## 2024-03-11 NOTE — TELEPHONE ENCOUNTER
Started him on Rinvoq, sent it to [facility] as he needed it to be delivered, he lives in [place].     Please check with [facility] to make sure they are doing PA and patient gets the medication.

## 2024-03-11 NOTE — PROGRESS NOTES
Patient ID: [id number]     Chief Complaint: Ankylosing spondylitis of lumbosacral region (Pt states is having a lot of generalized joint pain.  Patient states he is mainly having pain to his lower legs. )      HPI:     Keron Lott is a 53 y.o. male here today for follow up of ankylosing spondylitis.     He has AS, non radiographic. H/o symptoms of inflammatory back pain.   He continues to take Humira 40 mg subcu every other week, slowly lose in response to Humira, its is not last lasting for full 14 days.  Complaining of pain in bilateral hands and joints in feet.  Admits swelling in hand joints.  C/o pain in legs and stiffness. Back pain is better. Admits sciatica symptoms on right and numbness in left leg. Gabapentin helps with the symptoms, he takes 600 mg at night.   Taking Humira and it continues to help with back pain.   He had ruptured disc and had fusion of L4-5 in 2013. He did well after that for some time, for last 2-3 years he started having back pain again. Steroid injections are not helping that much. Back pain associated with pain in bilateral legs, worse on right. Leg pain is recent and worse. He is a  and had to drive because of these symptoms.   Continues to have issues with sciatica and bilateral leg pain, mild improvement with SHLOMO to lower back done by Dr. Norris.  He saw Dr. Anatoliy hall, repeat back injections did not help, was told L5-S1 disc is bulging on nerves.    Admits oral ulcers every couple of months, never had it in nose. No ulcers today.  Autoimmune diseases in family: Father also have back issues. Cousins have back issues.  Denies fevers, rashes, photosensitivity, history of DVT or PE, history of stroke or seizures, history of Ml, history of inflammatory eye diseases, history of malignancy, Raynaud's phenomenon.  Smoking: life time non smoker.       Social History     Tobacco Use   Smoking Status Never   Smokeless Tobacco Former          No past medical history on  file.     Past Surgical History:   Procedure Laterality Date    BACK SURGERY  2013    L4 and L5 fusion       Review of patient's allergies indicates:  No Known Allergies    Outpatient Medications Marked as Taking for the 3/11/24 encounter (Office Visit) with Anna Dukes MD   Medication Sig Dispense Refill    gabapentin (NEURONTIN) 300 MG capsule TAKE 2 CAPSULES (600 MG TOTAL) BY MOUTH ONCE DAILY. 60 capsule 6    meloxicam (MOBIC) 7.5 MG tablet Take 2 tablets (15 mg total) by mouth once daily. 30 tablet 0    [DISCONTINUED] adalimumab (HUMIRA,CF, PEN) 40 mg/0.4 mL PnKt Inject 0.4 mLs (40 mg total) into the skin every 14 (fourteen) days. 2 Pen 6       Social History     Socioeconomic History    Marital status:    Tobacco Use    Smoking status: Never    Smokeless tobacco: Former   Substance and Sexual Activity    Alcohol use: Yes     Comment: May be two beers a month    Drug use: Never    Sexual activity: Yes        Family History   Problem Relation Age of Onset    Heart disease Father     Hypertension Father         Immunization History   Administered Date(s) Administered    COVID-19, MRNA, LN-S, PF (MODERNA FULL 0.5 ML DOSE) 07/24/2021, 08/21/2021    Influenza - Quadrivalent - PF *Preferred* (6 months and older) 10/04/2020    Tdap 10/28/2011, 10/09/2022    Zoster Recombinant 03/11/2024       Patient Care Team:  Jonah Vences MD as PCP - General (Family Medicine)     Subjective:     ROS    Constitutional:  Denies chills. Denies fever. Denies night sweats. Denies weight loss.   Ophthalmology: Denies blurred vision. Denies dry eyes. Denies eye pain. Denies Itching and redness.   ENT: Denies oral ulcers. Denies epistaxis. Denies dry mouth. Denies swollen glands.   Endocrine: Denies diabetes. Denies thyroid Problems.   Respiratory: Denies cough. Denies shortness of breath. Denies shortness of breath with exertion. Denies hemoptysis.   Cardiovascular: Denies chest pain at rest. Denies chest pain with  "exertion. Denies palpitations.    Gastrointestinal: Denies abdominal pain. Denies diarrhea. Denies nausea. Denies vomiting. Denies hematemesis or hematochezia. Denies heartburn.  Genitourinary: Denies blood in urine.  Musculoskeletal: See HPI for details  Integumentary: Denies rash. Denies photosensitivity.   Peripheral Vascular: Denies Ulcers of hands and/or feet. Denies Cold extremities.   Neurologic: Denies dizziness. Denies headache.  Denies loss of strength. Denies numbness or tingling.   Psychiatric: Denies depression. Denies anxiety. Denies suicidal/homicidal ideations.      Objective:     BP (!) 127/92 (BP Location: Left arm, Patient Position: Sitting, BP Method: Large (Automatic))   Pulse 76   Temp 97.9 °F (36.6 °C) (Oral)   Resp 18   Ht 5' 9" (1.753 m)   Wt 98.8 kg (217 lb 12.8 oz)   SpO2 96%   BMI 32.16 kg/m²     Physical Exam    General Appearance: alert, pleasant, in no acute distress.  Skin: Skin color, texture, turgor normal. No rashes or lesions.  Eyes:  extraocular movement intact (EOMI), pupils equal, round, reactive to light and accommodation, conjunctiva clear.  ENT: No oral or nasal ulcers.  Neck:  Neck supple. No adenopathy.   Lungs: CTA throughout without crackles, rhonchi, or wheezes.   Heart: RRR w/o murmurs.  No edema.  Abdomen: Soft, non-tender, no masses, rebound or guarding.  Neuro: Alert, oriented, CN II-XII GI, sensory and motor innervation intact.  Psych: Alert, oriented, normal eye contact.    Joint Exam:  DIPs, PIPs, MCPs:  Tenderness with palpation of right 3rd MCP with mild swelling and tenderness over 2nd and 3rd MCPs.  Wrists: no pain on palpation, no swelling or redness, good range of motion.  Elbows: no pain on palpation, no swelling or redness, good range of motion, no nodules.  Shoulders: no pain on palpation, no swelling or redness, good range of motion.  Back/Neck: no pain on palpation. Shonna's positive bilaterally in the past, modified schober's normal  Ankles: no " pain on palpation, no swelling or redness, good range of motion.  Feet: no pain on palpation, no swelling or redness, no nodules.    Labs:     3/2020: HLAB27 positive. ESR, CRP normal. CBC ok. RF negative.  10/2020: Negative Hep B, Hep C, HIV and quantiferon tb.   22:  CBC, CMP okay.  GFR 69.  LFTs normal.  ESR, CRP normal.  Negative hepatitis-B, hepatitis-C, QuantiFERON TB.  22:  CBC, CMP okay.  ESR, CRP normal.  2023; CBC, CMP okay.  2024; CBC okay.  Creatinine 1.29, GFR greater than 60.     Imagin: MRI of lumbar spine without contrast: Stable L4-L5 anterior and posterior fusion. Mild bulging at L5-S1 level.  10/2020: Chest X-ray normal. Normal X-ray of bilateral elbows, C spine, hips and SI joints. DJD of thoracic spine. Posterior hardware fixation at L4-5.     Assessment:       ICD-10-CM ICD-9-CM   1. Ankylosing spondylitis of lumbosacral region  M45.7 720.0   2. Inflammatory arthritis  M19.90 714.9   3. HLA B27 (HLA B27 positive)  Z15.89 V84.89   4. Pain in both lower extremities  M79.604 729.5    M79.605    5. Osteoarthritis of lumbar spine, unspecified spinal osteoarthritis complication status  M47.816 721.3   6. Drug-induced immunodeficiency  D84.821 279.3    Z79.899 E947.9   7. Need for shingles vaccine  Z23 V04.89            Plan:     1. Ankylosing spondylitis of lumbosacral region and inflammatory arthritis: P/w features of inflammation back pain. Synovitis in bilateral elbows on exam, Shonna's test positive bilaterally. He has ankylosing spondylitis. Admits intermittent oral ulcers. Denies history of genital ulcers or nasal ulcers, inflammatory eye disease symptoms or inflammatory bowel disease symptoms.   - Non radiographic ankylosing spondylitis of lumbar spine. HLAB27 positive. Tried and failed Ibuprofen and Naproxen. Failed Cimzia, severe sinus issues with it. Patient felt better on Taltz, but insurance did not approve it. Cosentyx-did not help at all.   - slowly dosing  response to Humira.  Stop Humira.  - mild synovitis in right hand on exam today.  - start him on Rinvoq 15 mg daily, discussed the risks and benefits of medication with the patient.  No cardiac history, no history of DVT or PE or stroke.  - Labs today and in 4 weeks after starting Rinvoq.     2. Osteoarthritis of lumbar spine: He had seen Dr. Norris in the past. He saw Dr. Anatoliy hall, repeat back injections did not help, was told L5-S1 disc is bulging on nerves, he saw Dr Robbin Rodas in [place], was told no intervention needed at this time.      3. Sciatica of right side: C/w stretches and exercises.      4. High risk medication use:  - Persons with rheumatoid arthritis, lupus, psoriatic arthritis and other autoimmune diseases are at increased risk of cardiovascular disease including heart attack and stroke. We recommend that all patients with these conditions have annual health maintenance exams including lipid measurements, blood pressure measurements, and smoking cessation counseling when applicable at their primary care provider's office.   -Advised to stay up-to-date on age appropriate vaccinations and malignancy screening.    - Uptodate on COVID vaccines. Refused pneumonia vaccine, he will think about it. Refused flu vaccine in the past.  - Singles vaccine 3/11/24.      5. Elevated BP without diagnosis of hypertension: BP normal per patient at home, he will follow up with his PCP.     6. Low GFR with NSAID's: Advised to stay hydrated and avoid NSAID's. Will monitor.           Follow up in about 3 months (around 6/11/2024) for 3-4 months with me. In addition to their scheduled follow up, the patient has also been instructed to follow up on as needed basis.      Total time spent with patient and documentation is 40 minutes. All questions were answered to patient's satisfaction and patient verbalized understanding.

## 2024-03-13 LAB
GAMMA INTERFERON BACKGROUND BLD IA-ACNC: 0.02 IU/ML
M TB IFN-G BLD-IMP: NEGATIVE
M TB IFN-G CD4+ BCKGRND COR BLD-ACNC: 0.01 IU/ML
M TB IFN-G CD4+CD8+ BCKGRND COR BLD-ACNC: 0.01 IU/ML
MITOGEN IGNF BCKGRD COR BLD-ACNC: >10 IU/ML

## 2024-03-21 ENCOUNTER — TELEPHONE (OUTPATIENT)
Dept: RHEUMATOLOGY | Facility: CLINIC | Age: 54
End: 2024-03-21
Payer: COMMERCIAL

## 2024-03-21 NOTE — TELEPHONE ENCOUNTER
----- Message from Ariana Roy sent at 3/21/2024  3:33 PM CDT -----  Regarding: Please advise  Pt wants to know when do he have to do blood work after he started the new meds? Please advise [phone]    Thank You

## 2024-03-22 DIAGNOSIS — M45.7 ANKYLOSING SPONDYLITIS OF LUMBOSACRAL REGION: Primary | ICD-10-CM

## 2024-04-17 ENCOUNTER — LAB VISIT (OUTPATIENT)
Dept: LAB | Facility: HOSPITAL | Age: 54
End: 2024-04-17
Attending: INTERNAL MEDICINE
Payer: COMMERCIAL

## 2024-04-17 DIAGNOSIS — M45.7 ANKYLOSING SPONDYLITIS OF LUMBOSACRAL REGION: ICD-10-CM

## 2024-04-17 LAB
ALBUMIN SERPL-MCNC: 4.1 G/DL (ref 3.5–5)
ALBUMIN/GLOB SERPL: 1.5 RATIO (ref 1.1–2)
ALP SERPL-CCNC: 54 UNIT/L (ref 40–150)
ALT SERPL-CCNC: 26 UNIT/L (ref 0–55)
AST SERPL-CCNC: 21 UNIT/L (ref 5–34)
BASOPHILS # BLD AUTO: 0.05 X10(3)/MCL
BASOPHILS NFR BLD AUTO: 0.8 %
BILIRUB SERPL-MCNC: 0.9 MG/DL
BUN SERPL-MCNC: 19.8 MG/DL (ref 8.4–25.7)
CALCIUM SERPL-MCNC: 8.9 MG/DL (ref 8.4–10.2)
CHLORIDE SERPL-SCNC: 106 MMOL/L (ref 98–107)
CO2 SERPL-SCNC: 25 MMOL/L (ref 22–29)
CREAT SERPL-MCNC: 1.37 MG/DL (ref 0.73–1.18)
EOSINOPHIL # BLD AUTO: 0.06 X10(3)/MCL (ref 0–0.9)
EOSINOPHIL NFR BLD AUTO: 0.9 %
ERYTHROCYTE [DISTWIDTH] IN BLOOD BY AUTOMATED COUNT: 12 % (ref 11.5–17)
GFR SERPLBLD CREATININE-BSD FMLA CKD-EPI: >60 MLS/MIN/1.73/M2
GLOBULIN SER-MCNC: 2.7 GM/DL (ref 2.4–3.5)
GLUCOSE SERPL-MCNC: 109 MG/DL (ref 74–100)
HCT VFR BLD AUTO: 41.2 % (ref 42–52)
HGB BLD-MCNC: 14.3 G/DL (ref 14–18)
IMM GRANULOCYTES # BLD AUTO: 0.02 X10(3)/MCL (ref 0–0.04)
IMM GRANULOCYTES NFR BLD AUTO: 0.3 %
LYMPHOCYTES # BLD AUTO: 3.26 X10(3)/MCL (ref 0.6–4.6)
LYMPHOCYTES NFR BLD AUTO: 51.6 %
MCH RBC QN AUTO: 30.4 PG (ref 27–31)
MCHC RBC AUTO-ENTMCNC: 34.7 G/DL (ref 33–36)
MCV RBC AUTO: 87.7 FL (ref 80–94)
MONOCYTES # BLD AUTO: 0.38 X10(3)/MCL (ref 0.1–1.3)
MONOCYTES NFR BLD AUTO: 6 %
NEUTROPHILS # BLD AUTO: 2.55 X10(3)/MCL (ref 2.1–9.2)
NEUTROPHILS NFR BLD AUTO: 40.4 %
NRBC BLD AUTO-RTO: 0 %
PLATELET # BLD AUTO: 344 X10(3)/MCL (ref 130–400)
PMV BLD AUTO: 9.1 FL (ref 7.4–10.4)
POTASSIUM SERPL-SCNC: 3.8 MMOL/L (ref 3.5–5.1)
PROT SERPL-MCNC: 6.8 GM/DL (ref 6.4–8.3)
RBC # BLD AUTO: 4.7 X10(6)/MCL (ref 4.7–6.1)
SODIUM SERPL-SCNC: 140 MMOL/L (ref 136–145)
WBC # SPEC AUTO: 6.32 X10(3)/MCL (ref 4.5–11.5)

## 2024-04-17 PROCEDURE — 36415 COLL VENOUS BLD VENIPUNCTURE: CPT

## 2024-04-17 PROCEDURE — 80053 COMPREHEN METABOLIC PANEL: CPT

## 2024-04-17 PROCEDURE — 85025 COMPLETE CBC W/AUTO DIFF WBC: CPT

## 2024-06-11 DIAGNOSIS — M45.7 ANKYLOSING SPONDYLITIS OF LUMBOSACRAL REGION: ICD-10-CM

## 2024-06-11 RX ORDER — GABAPENTIN 300 MG/1
600 CAPSULE ORAL DAILY
Qty: 60 CAPSULE | Refills: 6 | Status: SHIPPED | OUTPATIENT
Start: 2024-06-11

## 2024-07-15 ENCOUNTER — TELEPHONE (OUTPATIENT)
Dept: RHEUMATOLOGY | Facility: CLINIC | Age: 54
End: 2024-07-15
Payer: COMMERCIAL

## 2024-07-15 NOTE — TELEPHONE ENCOUNTER
Pt called because he has a UTI and is currently taking Cipro 500  BID 10 Days so he wants to know if he needs to stop taking his Rinvoq because of this.    Protopic Counseling: Patient may experience a mild burning sensation during topical application. Protopic is not approved in children less than 2 years of age. There have been case reports of hematologic and skin malignancies in patients using topical calcineurin inhibitors although causality is questionable.

## 2024-08-15 ENCOUNTER — LAB VISIT (OUTPATIENT)
Dept: LAB | Facility: HOSPITAL | Age: 54
End: 2024-08-15
Attending: INTERNAL MEDICINE
Payer: COMMERCIAL

## 2024-08-15 ENCOUNTER — OFFICE VISIT (OUTPATIENT)
Dept: RHEUMATOLOGY | Facility: CLINIC | Age: 54
End: 2024-08-15
Payer: COMMERCIAL

## 2024-08-15 VITALS
RESPIRATION RATE: 18 BRPM | SYSTOLIC BLOOD PRESSURE: 126 MMHG | OXYGEN SATURATION: 97 % | DIASTOLIC BLOOD PRESSURE: 83 MMHG | HEART RATE: 77 BPM | TEMPERATURE: 98 F | BODY MASS INDEX: 31.74 KG/M2 | HEIGHT: 69 IN | WEIGHT: 214.31 LBS

## 2024-08-15 DIAGNOSIS — M45.7 ANKYLOSING SPONDYLITIS OF LUMBOSACRAL REGION: Primary | ICD-10-CM

## 2024-08-15 DIAGNOSIS — Z15.89 HLA B27 (HLA B27 POSITIVE): ICD-10-CM

## 2024-08-15 DIAGNOSIS — Z79.899 DRUG-INDUCED IMMUNODEFICIENCY: ICD-10-CM

## 2024-08-15 DIAGNOSIS — M54.31 SCIATICA OF RIGHT SIDE: ICD-10-CM

## 2024-08-15 DIAGNOSIS — D84.821 DRUG-INDUCED IMMUNODEFICIENCY: ICD-10-CM

## 2024-08-15 DIAGNOSIS — M45.7 ANKYLOSING SPONDYLITIS OF LUMBOSACRAL REGION: ICD-10-CM

## 2024-08-15 DIAGNOSIS — M47.816 OSTEOARTHRITIS OF LUMBAR SPINE, UNSPECIFIED SPINAL OSTEOARTHRITIS COMPLICATION STATUS: ICD-10-CM

## 2024-08-15 DIAGNOSIS — M79.604 PAIN IN BOTH LOWER EXTREMITIES: ICD-10-CM

## 2024-08-15 DIAGNOSIS — M19.90 INFLAMMATORY ARTHRITIS: ICD-10-CM

## 2024-08-15 DIAGNOSIS — M79.605 PAIN IN BOTH LOWER EXTREMITIES: ICD-10-CM

## 2024-08-15 DIAGNOSIS — N18.2 STAGE 2 CHRONIC KIDNEY DISEASE: ICD-10-CM

## 2024-08-15 LAB
ALBUMIN SERPL-MCNC: 3.9 G/DL (ref 3.5–5)
ALBUMIN/GLOB SERPL: 1.3 RATIO (ref 1.1–2)
ALP SERPL-CCNC: 60 UNIT/L (ref 40–150)
ALT SERPL-CCNC: 16 UNIT/L (ref 0–55)
ANION GAP SERPL CALC-SCNC: 8 MEQ/L
AST SERPL-CCNC: 13 UNIT/L (ref 5–34)
BASOPHILS # BLD AUTO: 0.04 X10(3)/MCL
BASOPHILS NFR BLD AUTO: 0.7 %
BILIRUB SERPL-MCNC: 0.6 MG/DL
BUN SERPL-MCNC: 13.5 MG/DL (ref 8.4–25.7)
CALCIUM SERPL-MCNC: 10 MG/DL (ref 8.4–10.2)
CHLORIDE SERPL-SCNC: 106 MMOL/L (ref 98–107)
CO2 SERPL-SCNC: 27 MMOL/L (ref 22–29)
CREAT SERPL-MCNC: 1.12 MG/DL (ref 0.73–1.18)
CREAT/UREA NIT SERPL: 12
CRP SERPL-MCNC: 2.5 MG/L
EOSINOPHIL # BLD AUTO: 0.07 X10(3)/MCL (ref 0–0.9)
EOSINOPHIL NFR BLD AUTO: 1.2 %
ERYTHROCYTE [DISTWIDTH] IN BLOOD BY AUTOMATED COUNT: 12.6 % (ref 11.5–17)
ERYTHROCYTE [SEDIMENTATION RATE] IN BLOOD: 3 MM/HR (ref 0–15)
GFR SERPLBLD CREATININE-BSD FMLA CKD-EPI: >60 ML/MIN/1.73/M2
GLOBULIN SER-MCNC: 2.9 GM/DL (ref 2.4–3.5)
GLUCOSE SERPL-MCNC: 89 MG/DL (ref 74–100)
HCT VFR BLD AUTO: 38.5 % (ref 42–52)
HGB BLD-MCNC: 13.5 G/DL (ref 14–18)
IMM GRANULOCYTES # BLD AUTO: 0.06 X10(3)/MCL (ref 0–0.04)
IMM GRANULOCYTES NFR BLD AUTO: 1 %
LYMPHOCYTES # BLD AUTO: 2.71 X10(3)/MCL (ref 0.6–4.6)
LYMPHOCYTES NFR BLD AUTO: 46.2 %
MCH RBC QN AUTO: 31.9 PG (ref 27–31)
MCHC RBC AUTO-ENTMCNC: 35.1 G/DL (ref 33–36)
MCV RBC AUTO: 91 FL (ref 80–94)
MONOCYTES # BLD AUTO: 0.51 X10(3)/MCL (ref 0.1–1.3)
MONOCYTES NFR BLD AUTO: 8.7 %
NEUTROPHILS # BLD AUTO: 2.48 X10(3)/MCL (ref 2.1–9.2)
NEUTROPHILS NFR BLD AUTO: 42.2 %
NRBC BLD AUTO-RTO: 0 %
PLATELET # BLD AUTO: 402 X10(3)/MCL (ref 130–400)
PMV BLD AUTO: 8.7 FL (ref 7.4–10.4)
POTASSIUM SERPL-SCNC: 3.8 MMOL/L (ref 3.5–5.1)
PROT SERPL-MCNC: 6.8 GM/DL (ref 6.4–8.3)
RBC # BLD AUTO: 4.23 X10(6)/MCL (ref 4.7–6.1)
SODIUM SERPL-SCNC: 141 MMOL/L (ref 136–145)
WBC # BLD AUTO: 5.87 X10(3)/MCL (ref 4.5–11.5)

## 2024-08-15 PROCEDURE — 1159F MED LIST DOCD IN RCRD: CPT | Mod: CPTII,,, | Performed by: INTERNAL MEDICINE

## 2024-08-15 PROCEDURE — 86140 C-REACTIVE PROTEIN: CPT

## 2024-08-15 PROCEDURE — 80053 COMPREHEN METABOLIC PANEL: CPT

## 2024-08-15 PROCEDURE — 90750 HZV VACC RECOMBINANT IM: CPT | Mod: PBBFAC

## 2024-08-15 PROCEDURE — 3008F BODY MASS INDEX DOCD: CPT | Mod: CPTII,,, | Performed by: INTERNAL MEDICINE

## 2024-08-15 PROCEDURE — 36415 COLL VENOUS BLD VENIPUNCTURE: CPT

## 2024-08-15 PROCEDURE — 99214 OFFICE O/P EST MOD 30 MIN: CPT | Mod: PBBFAC | Performed by: INTERNAL MEDICINE

## 2024-08-15 PROCEDURE — 85652 RBC SED RATE AUTOMATED: CPT

## 2024-08-15 PROCEDURE — 3079F DIAST BP 80-89 MM HG: CPT | Mod: CPTII,,, | Performed by: INTERNAL MEDICINE

## 2024-08-15 PROCEDURE — 90471 IMMUNIZATION ADMIN: CPT | Mod: PBBFAC

## 2024-08-15 PROCEDURE — 99214 OFFICE O/P EST MOD 30 MIN: CPT | Mod: S$PBB,,, | Performed by: INTERNAL MEDICINE

## 2024-08-15 PROCEDURE — 85025 COMPLETE CBC W/AUTO DIFF WBC: CPT

## 2024-08-15 PROCEDURE — 3074F SYST BP LT 130 MM HG: CPT | Mod: CPTII,,, | Performed by: INTERNAL MEDICINE

## 2024-08-15 RX ORDER — UPADACITINIB 15 MG/1
15 TABLET, EXTENDED RELEASE ORAL DAILY
Qty: 30 TABLET | Refills: 11 | Status: SHIPPED | OUTPATIENT
Start: 2024-08-15

## 2024-08-15 RX ADMIN — Medication 0.5 ML: at 03:08

## 2024-08-15 NOTE — PROGRESS NOTES
Patient ID: [id number]     Chief Complaint: Follow-up (RTC, just finished sinus infection-stopped Rinvoq, hip pain; more sluggish)    HPI:     Keron Lott is a 54 y.o. male here today for follow up of ankylosing spondylitis.     Today, patient reports that he is feeling well overall and has no acute complaints.  Patient does report that he had to hold revoked dose last week secondary to sinus infection with antibiotic therapy.  Also had to hold revoked therapy approximately 1 month ago secondary to UTI for which he was being treated with antibiotics.  Patient restarted taking revoked on Monday of this week.  Overall, patient does reports symptomatic improvement since starting revoked.  He has failed multiple other medications in the past or had insurance issues with coverage.      Autoimmune diseases in family: Father also have back issues. Cousins have back issues.  Denies fevers, rashes, photosensitivity, history of DVT or PE, history of stroke or seizures, history of Ml, history of inflammatory eye diseases, history of malignancy, Raynaud's phenomenon.  Smoking: life time non smoker.       Social History     Tobacco Use   Smoking Status Never   Smokeless Tobacco Former          No past medical history on file.     Past Surgical History:   Procedure Laterality Date    BACK SURGERY  2013    L4 and L5 fusion       Review of patient's allergies indicates:  No Known Allergies    Outpatient Medications Marked as Taking for the 8/15/24 encounter (Office Visit) with Anna Dukes MD   Medication Sig Dispense Refill    gabapentin (NEURONTIN) 300 MG capsule Take 2 capsules (600 mg total) by mouth once daily. 60 capsule 6    [DISCONTINUED] upadacitinib (RINVOQ) 15 mg 24 hr tablet Take 1 tablet (15 mg total) by mouth once daily. 30 tablet 11     Current Facility-Administered Medications for the 8/15/24 encounter (Office Visit) with Anna Dukes MD   Medication Dose Route Frequency Provider Last Rate Last Admin     varicella zoster (Shingrix) IM vaccine (>/= 51 yo)  0.5 mL Intramuscular 1 time in Clinic/HOD            Social History     Socioeconomic History    Marital status:    Tobacco Use    Smoking status: Never    Smokeless tobacco: Former   Substance and Sexual Activity    Alcohol use: Yes     Comment: May be two beers a month    Drug use: Never    Sexual activity: Yes        Family History   Problem Relation Name Age of Onset    Heart disease Father      Hypertension Father          Immunization History   Administered Date(s) Administered    COVID-19, MRNA, LN-S, PF (MODERNA FULL 0.5 ML DOSE) 07/24/2021, 08/21/2021    Influenza - Quadrivalent - PF *Preferred* (6 months and older) 10/04/2020    Tdap 10/28/2011, 10/09/2022    Zoster Recombinant 03/11/2024       Patient Care Team:  Jonah Vences MD as PCP - General (Family Medicine)     Subjective:     ROS  Constitutional:  Denies chills. Denies fever. Denies night sweats. Denies weight loss.   Ophthalmology: Denies blurred vision. Denies dry eyes. Denies eye pain. Denies Itching and redness.   ENT: Denies oral ulcers. Denies epistaxis. Denies dry mouth. Denies swollen glands.   Endocrine: Denies diabetes. Denies thyroid Problems.   Respiratory: Denies cough. Denies shortness of breath. Denies shortness of breath with exertion. Denies hemoptysis.   Cardiovascular: Denies chest pain at rest. Denies chest pain with exertion. Denies palpitations.    Gastrointestinal: Denies abdominal pain. Denies diarrhea. Denies nausea. Denies vomiting. Denies hematemesis or hematochezia. Denies heartburn.  Genitourinary: Denies blood in urine.  Musculoskeletal: See HPI for details  Integumentary: Denies rash. Denies photosensitivity.   Peripheral Vascular: Denies Ulcers of hands and/or feet. Denies Cold extremities.   Neurologic: Denies dizziness. Denies headache.  Denies loss of strength. Denies numbness or tingling.   Psychiatric: Denies depression. Denies anxiety. Denies  "suicidal/homicidal ideations.      Objective:     /83 (BP Location: Left arm, Patient Position: Sitting, BP Method: Large (Automatic))   Pulse 77   Temp 97.8 °F (36.6 °C) (Oral)   Resp 18   Ht 5' 8.9" (1.75 m)   Wt 97.2 kg (214 lb 4.6 oz)   SpO2 97%   BMI 31.74 kg/m²     Physical Exam  General Appearance: alert, pleasant, in no acute distress.  Skin: Skin color, texture, turgor normal. No rashes or lesions.  Eyes:  extraocular movement intact (EOMI), pupils equal, round, reactive to light and accommodation, conjunctiva clear.  ENT: No oral or nasal ulcers.  Neck:  Neck supple. No adenopathy.   Lungs: CTA throughout without crackles, rhonchi, or wheezes.   Heart: RRR w/o murmurs.  No edema.  Abdomen: Soft, non-tender, no masses, rebound or guarding.  Neuro: Alert, oriented, CN II-XII GI, sensory and motor innervation intact.  Psych: Alert, oriented, normal eye contact.    Joint Exam:  DIPs, PIPs, MCPs:  no pain on palpation, no swelling or redness, good range of motion.  Wrists: no pain on palpation, no swelling or redness, good range of motion.  Elbows: no pain on palpation, no swelling or redness, good range of motion, no nodules.  Shoulders: no pain on palpation, no swelling or redness, good range of motion.  Back/Neck: no pain on palpation. Shonna's positive bilaterally in the past, modified schober's normal  Ankles: no pain on palpation, no swelling or redness, good range of motion.  Feet: no pain on palpation, no swelling or redness, no nodules.    Labs:   3/2020: HLAB27 positive. ESR, CRP normal. CBC ok. RF negative.  10/2020: Negative Hep B, Hep C, HIV and quantiferon tb.   22:  CBC, CMP okay.  GFR 69.  LFTs normal.  ESR, CRP normal.  Negative hepatitis-B, hepatitis-C, QuantiFERON TB.  22:  CBC, CMP okay.  ESR, CRP normal.  2023; CBC, CMP okay.  2024; CBC okay.  Creatinine 1.29, GFR greater than 60.     Imagin: MRI of lumbar spine without contrast: Stable L4-L5 " anterior and posterior fusion. Mild bulging at L5-S1 level.  10/2020: Chest X-ray normal. Normal X-ray of bilateral elbows, C spine, hips and SI joints. DJD of thoracic spine. Posterior hardware fixation at L4-5.     Assessment:       ICD-10-CM ICD-9-CM   1. Ankylosing spondylitis of lumbosacral region  M45.7 720.0   2. Inflammatory arthritis  M19.90 714.9   3. HLA B27 (HLA B27 positive)  Z15.89 V84.89   4. Pain in both lower extremities  M79.604 729.5    M79.605    5. Drug-induced immunodeficiency  D84.821 279.3    Z79.899 E947.9   6. Sciatica of right side  M54.31 724.3   7. Osteoarthritis of lumbar spine, unspecified spinal osteoarthritis complication status  M47.816 721.3   8. Stage 2 chronic kidney disease  N18.2 585.2     Plan:     1. Ankylosing spondylitis of lumbosacral region and inflammatory arthritis: P/w features of inflammation back pain. Synovitis in bilateral elbows on exam, Shonna's test positive bilaterally. He has ankylosing spondylitis. Admits intermittent oral ulcers. Denies history of genital ulcers or nasal ulcers, inflammatory eye disease symptoms or inflammatory bowel disease symptoms.   - Non radiographic ankylosing spondylitis of lumbar spine. HLAB27 positive. Tried and failed Ibuprofen and Naproxen. Failed Cimzia, severe sinus issues with it. Patient felt better on Taltz, but insurance did not approve it. Cosentyx-did not help at all.   - continue Rinvoq 15 mg daily as patient reports symptomatic improvement, discussed the risks and benefits of medication with the patient.  No cardiac history, no history of DVT or PE or stroke.  - Labs today and Q3 months while on Rinvoq     2. Osteoarthritis of lumbar spine: He had seen Dr. Norris in the past. He saw Dr. Anatoliy hall, repeat back injections did not help, was told L5-S1 disc is bulging on nerves, he saw Dr Robbin Rodas in [place], was told no intervention needed at this time.      3. Sciatica of right side: C/w stretches and  exercises.      4. High risk medication use:  - Persons with rheumatoid arthritis, lupus, psoriatic arthritis and other autoimmune diseases are at increased risk of cardiovascular disease including heart attack and stroke. We recommend that all patients with these conditions have annual health maintenance exams including lipid measurements, blood pressure measurements, and smoking cessation counseling when applicable at their primary care provider's office.   -Advised to stay up-to-date on age appropriate vaccinations and malignancy screening.    - Uptodate on COVID vaccines. Refused pneumonia vaccine, he will think about it. Refused flu vaccine in the past.  - Singles vaccine 3/11/24. Second dose today in clinic.      6. Low GFR with NSAID's: Advised to stay hydrated and avoid NSAID's when able. Will continue to follow.         Follow up in about 6 months (around 2/15/2025) for with me. In addition to their scheduled follow up, the patient has also been instructed to follow up on as needed basis.     Donal Guerrero, DO  [facility] Internal Medicine PGY-2

## 2024-08-15 NOTE — PROGRESS NOTES
VSS, patient tolerated Zoster vaccine well, L deltoid; advised of possible pain and to call clinic with any issues.

## 2025-01-10 DIAGNOSIS — M45.7 ANKYLOSING SPONDYLITIS OF LUMBOSACRAL REGION: ICD-10-CM

## 2025-01-10 RX ORDER — GABAPENTIN 300 MG/1
600 CAPSULE ORAL DAILY
Qty: 60 CAPSULE | Refills: 6 | Status: SHIPPED | OUTPATIENT
Start: 2025-01-10

## 2025-02-17 DIAGNOSIS — Z15.89 HLA B27 (HLA B27 POSITIVE): ICD-10-CM

## 2025-02-17 RX ORDER — UPADACITINIB 15 MG/1
15 TABLET, EXTENDED RELEASE ORAL DAILY
Qty: 30 TABLET | Refills: 11 | Status: CANCELLED | OUTPATIENT
Start: 2025-02-17

## 2025-02-18 ENCOUNTER — OFFICE VISIT (OUTPATIENT)
Dept: RHEUMATOLOGY | Facility: CLINIC | Age: 55
End: 2025-02-18
Payer: COMMERCIAL

## 2025-02-18 ENCOUNTER — LAB VISIT (OUTPATIENT)
Dept: LAB | Facility: HOSPITAL | Age: 55
End: 2025-02-18
Attending: INTERNAL MEDICINE
Payer: COMMERCIAL

## 2025-02-18 VITALS
TEMPERATURE: 98 F | OXYGEN SATURATION: 99 % | HEIGHT: 68 IN | SYSTOLIC BLOOD PRESSURE: 133 MMHG | WEIGHT: 223.38 LBS | DIASTOLIC BLOOD PRESSURE: 88 MMHG | HEART RATE: 69 BPM | RESPIRATION RATE: 18 BRPM | BODY MASS INDEX: 33.85 KG/M2

## 2025-02-18 DIAGNOSIS — D84.821 DRUG-INDUCED IMMUNODEFICIENCY: ICD-10-CM

## 2025-02-18 DIAGNOSIS — M45.7 ANKYLOSING SPONDYLITIS OF LUMBOSACRAL REGION: ICD-10-CM

## 2025-02-18 DIAGNOSIS — Z79.899 DRUG-INDUCED IMMUNODEFICIENCY: ICD-10-CM

## 2025-02-18 DIAGNOSIS — M79.605 PAIN IN BOTH LOWER EXTREMITIES: ICD-10-CM

## 2025-02-18 DIAGNOSIS — Z15.89 HLA B27 (HLA B27 POSITIVE): Primary | ICD-10-CM

## 2025-02-18 DIAGNOSIS — M79.604 PAIN IN BOTH LOWER EXTREMITIES: ICD-10-CM

## 2025-02-18 DIAGNOSIS — M19.90 INFLAMMATORY ARTHRITIS: ICD-10-CM

## 2025-02-18 DIAGNOSIS — Z15.89 HLA B27 (HLA B27 POSITIVE): ICD-10-CM

## 2025-02-18 LAB
ALBUMIN SERPL-MCNC: 4.4 G/DL (ref 3.5–5)
ALBUMIN/GLOB SERPL: 1.4 RATIO (ref 1.1–2)
ALP SERPL-CCNC: 51 UNIT/L (ref 40–150)
ALT SERPL-CCNC: 27 UNIT/L (ref 0–55)
ANION GAP SERPL CALC-SCNC: 7 MEQ/L
AST SERPL-CCNC: 20 UNIT/L (ref 5–34)
BASOPHILS # BLD AUTO: 0.02 X10(3)/MCL
BASOPHILS NFR BLD AUTO: 0.4 %
BILIRUB SERPL-MCNC: 0.9 MG/DL
BUN SERPL-MCNC: 14.1 MG/DL (ref 8.4–25.7)
CALCIUM SERPL-MCNC: 9.4 MG/DL (ref 8.4–10.2)
CHLORIDE SERPL-SCNC: 104 MMOL/L (ref 98–107)
CO2 SERPL-SCNC: 28 MMOL/L (ref 22–29)
CREAT SERPL-MCNC: 0.96 MG/DL (ref 0.72–1.25)
CREAT/UREA NIT SERPL: 15
CRP SERPL-MCNC: 1.6 MG/L
EOSINOPHIL # BLD AUTO: 0.02 X10(3)/MCL (ref 0–0.9)
EOSINOPHIL NFR BLD AUTO: 0.4 %
ERYTHROCYTE [DISTWIDTH] IN BLOOD BY AUTOMATED COUNT: 12.3 % (ref 11.5–17)
ERYTHROCYTE [SEDIMENTATION RATE] IN BLOOD: <1 MM/HR (ref 0–15)
GFR SERPLBLD CREATININE-BSD FMLA CKD-EPI: >60 ML/MIN/1.73/M2
GLOBULIN SER-MCNC: 3.1 GM/DL (ref 2.4–3.5)
GLUCOSE SERPL-MCNC: 104 MG/DL (ref 74–100)
HBV CORE AB SERPL QL IA: NONREACTIVE
HBV SURFACE AB SER-ACNC: 0.37 MIU/ML
HBV SURFACE AB SERPL IA-ACNC: NONREACTIVE M[IU]/ML
HBV SURFACE AG SERPL QL IA: NONREACTIVE
HCT VFR BLD AUTO: 41.8 % (ref 42–52)
HCV AB SERPL QL IA: NONREACTIVE
HGB BLD-MCNC: 14.3 G/DL (ref 14–18)
IMM GRANULOCYTES # BLD AUTO: 0.02 X10(3)/MCL (ref 0–0.04)
IMM GRANULOCYTES NFR BLD AUTO: 0.4 %
LYMPHOCYTES # BLD AUTO: 2.02 X10(3)/MCL (ref 0.6–4.6)
LYMPHOCYTES NFR BLD AUTO: 44.8 %
MCH RBC QN AUTO: 31.8 PG (ref 27–31)
MCHC RBC AUTO-ENTMCNC: 34.2 G/DL (ref 33–36)
MCV RBC AUTO: 92.9 FL (ref 80–94)
MONOCYTES # BLD AUTO: 0.47 X10(3)/MCL (ref 0.1–1.3)
MONOCYTES NFR BLD AUTO: 10.4 %
NEUTROPHILS # BLD AUTO: 1.96 X10(3)/MCL (ref 2.1–9.2)
NEUTROPHILS NFR BLD AUTO: 43.6 %
NRBC BLD AUTO-RTO: 0 %
PLATELET # BLD AUTO: 409 X10(3)/MCL (ref 130–400)
PMV BLD AUTO: 9 FL (ref 7.4–10.4)
POTASSIUM SERPL-SCNC: 4.3 MMOL/L (ref 3.5–5.1)
PROT SERPL-MCNC: 7.5 GM/DL (ref 6.4–8.3)
RBC # BLD AUTO: 4.5 X10(6)/MCL (ref 4.7–6.1)
SODIUM SERPL-SCNC: 139 MMOL/L (ref 136–145)
WBC # BLD AUTO: 4.51 X10(3)/MCL (ref 4.5–11.5)

## 2025-02-18 PROCEDURE — 87340 HEPATITIS B SURFACE AG IA: CPT

## 2025-02-18 PROCEDURE — 86803 HEPATITIS C AB TEST: CPT

## 2025-02-18 PROCEDURE — 86706 HEP B SURFACE ANTIBODY: CPT

## 2025-02-18 PROCEDURE — 85025 COMPLETE CBC W/AUTO DIFF WBC: CPT

## 2025-02-18 PROCEDURE — 80053 COMPREHEN METABOLIC PANEL: CPT

## 2025-02-18 PROCEDURE — 86704 HEP B CORE ANTIBODY TOTAL: CPT

## 2025-02-18 PROCEDURE — 86140 C-REACTIVE PROTEIN: CPT

## 2025-02-18 PROCEDURE — 86480 TB TEST CELL IMMUN MEASURE: CPT

## 2025-02-18 PROCEDURE — 36415 COLL VENOUS BLD VENIPUNCTURE: CPT

## 2025-02-18 PROCEDURE — 85652 RBC SED RATE AUTOMATED: CPT

## 2025-02-18 PROCEDURE — 99214 OFFICE O/P EST MOD 30 MIN: CPT | Mod: PBBFAC | Performed by: INTERNAL MEDICINE

## 2025-02-18 RX ORDER — MELOXICAM 7.5 MG/1
15 TABLET ORAL DAILY
Qty: 30 TABLET | Refills: 0 | Status: SHIPPED | OUTPATIENT
Start: 2025-02-18

## 2025-02-18 RX ORDER — GABAPENTIN 300 MG/1
600 CAPSULE ORAL DAILY
Qty: 60 CAPSULE | Refills: 6 | Status: SHIPPED | OUTPATIENT
Start: 2025-02-18

## 2025-02-18 RX ORDER — UPADACITINIB 15 MG/1
15 TABLET, EXTENDED RELEASE ORAL DAILY
Qty: 30 TABLET | Refills: 11 | Status: SHIPPED | OUTPATIENT
Start: 2025-02-18

## 2025-02-18 NOTE — PROGRESS NOTES
Patient ID: [id number]     Chief Complaint: Follow-up (Patient states he is doing well overall. )    HPI:     Keron Lott is a 54 y.o. male here today for follow up of ankylosing spondylitis.     He has AS, non radiographic. H/o symptoms of inflammatory back pain.   Currently on Rinvoq and doing well on it. Back pain and back stiffness is better. Denies red, warm and swollen joints. No pain in joints of hands or feet.   Still have pain in legs with stiffness.  Admits sciatica symptoms on right and numbness in left leg. Steroid injections to back are helping follows with spine orthopedics.   Gabapentin helps with the symptoms, he takes 600 mg at night.   He had ruptured disc and had fusion of L4-5 in 2013. He did well after that for some time, for last 2-3 years he started having back pain again. Back pain associated with pain in bilateral legs, worse on right.  He was a .   Mild improvement with SHLOMO to lower back done by Dr. Norris.  He saw Dr. Anatoliy hall, repeat back injections did not help, was told L5-S1 disc is bulging on nerves. Seeing Dr Hernandez Sanchez at [facility] now.   Admits oral ulcers every couple of months, never had it in nose. No ulcers recently.    Autoimmune diseases in family: Father also have back issues. Cousins have back issues.  Denies fevers, rashes, photosensitivity, history of DVT or PE, history of stroke or seizures, history of Ml, history of inflammatory eye diseases, history of malignancy, Raynaud's phenomenon.  Smoking: life time non smoker.       Social History     Tobacco Use   Smoking Status Never   Smokeless Tobacco Former          No past medical history on file.     Past Surgical History:   Procedure Laterality Date    BACK SURGERY  2013    L4 and L5 fusion       Review of patient's allergies indicates:  No Known Allergies    Outpatient Medications Marked as Taking for the 2/18/25 encounter (Office Visit) with Anna Dukes MD   Medication Sig Dispense Refill     [DISCONTINUED] gabapentin (NEURONTIN) 300 MG capsule TAKE 2 CAPSULES (600 MG TOTAL) BY MOUTH ONCE DAILY. 60 capsule 6    [DISCONTINUED] meloxicam (MOBIC) 7.5 MG tablet Take 2 tablets (15 mg total) by mouth once daily. 30 tablet 0    [DISCONTINUED] upadacitinib (RINVOQ) 15 mg 24 hr tablet Take 1 tablet (15 mg total) by mouth once daily. 30 tablet 11       Social History     Socioeconomic History    Marital status:    Tobacco Use    Smoking status: Never    Smokeless tobacco: Former   Substance and Sexual Activity    Alcohol use: Yes     Comment: May be two beers a month    Drug use: Never    Sexual activity: Yes        Family History   Problem Relation Name Age of Onset    Heart disease Father      Hypertension Father          Immunization History   Administered Date(s) Administered    COVID-19, MRNA, LN-S, PF (MODERNA FULL 0.5 ML DOSE) 07/24/2021, 08/21/2021    Influenza - Quadrivalent - PF *Preferred* (6 months and older) 10/04/2020    Tdap 10/28/2011, 10/09/2022    Zoster Recombinant 03/11/2024, 08/15/2024       Patient Care Team:  Jonah Vences MD as PCP - General (Family Medicine)     Subjective:     ROS  Constitutional:  Denies chills. Denies fever. Denies night sweats. Denies weight loss.   Ophthalmology: Denies blurred vision. Denies dry eyes. Denies eye pain. Denies Itching and redness.   ENT: Denies oral ulcers. Denies epistaxis. Denies dry mouth. Denies swollen glands.   Endocrine: Denies diabetes. Denies thyroid Problems.   Respiratory: Denies cough. Denies shortness of breath. Denies shortness of breath with exertion. Denies hemoptysis.   Cardiovascular: Denies chest pain at rest. Denies chest pain with exertion. Denies palpitations.    Gastrointestinal: Denies abdominal pain. Denies diarrhea. Denies nausea. Denies vomiting. Denies hematemesis or hematochezia. Denies heartburn.  Genitourinary: Denies blood in urine.  Musculoskeletal: See HPI for details  Integumentary: Denies rash. Denies  "photosensitivity.   Peripheral Vascular: Denies Ulcers of hands and/or feet. Denies Cold extremities.   Neurologic: Denies dizziness. Denies headache.  Denies loss of strength. Denies numbness or tingling.   Psychiatric: Denies depression. Denies anxiety. Denies suicidal/homicidal ideations.      Objective:     /88 (BP Location: Left arm, Patient Position: Sitting)   Pulse 69   Temp 97.6 °F (36.4 °C) (Oral)   Resp 18   Ht 5' 8" (1.727 m)   Wt 101.3 kg (223 lb 6.4 oz)   SpO2 99%   BMI 33.97 kg/m²     Physical Exam  General Appearance: alert, pleasant, in no acute distress.  Skin: Skin color, texture, turgor normal. No rashes or lesions.  Eyes:  extraocular movement intact (EOMI), pupils equal, round, reactive to light and accommodation, conjunctiva clear.  ENT: No oral or nasal ulcers.  Neck:  Neck supple. No adenopathy.   Lungs: CTA throughout without crackles, rhonchi, or wheezes.   Heart: RRR w/o murmurs.  No edema.  Abdomen: Soft, non-tender, no masses, rebound or guarding.  Neuro: Alert, oriented, CN II-XII GI, sensory and motor innervation intact.  Psych: Alert, oriented, normal eye contact.    Joint Exam:  DIPs, PIPs, MCPs:  no pain on palpation, no swelling or redness, good range of motion.  Wrists: no pain on palpation, no swelling or redness, good range of motion.  Elbows: no pain on palpation, no swelling or redness, good range of motion, no nodules.  Shoulders: no pain on palpation, no swelling or redness, good range of motion.  Back/Neck: no pain on palpation. Shonna's positive bilaterally in the past, modified schober's normal  Ankles: no pain on palpation, no swelling or redness, good range of motion.  Feet: no pain on palpation, no swelling or redness, no nodules.    Labs:   3/2020: HLAB27 positive. ESR, CRP normal. CBC ok. RF negative.  10/2020: Negative Hep B, Hep C, HIV and quantiferon tb.   8/12/22:  CBC, CMP okay.  GFR 69.  LFTs normal.  ESR, CRP normal.  Negative hepatitis-B, " hepatitis-C, QuantiFERON TB.  22:  CBC, CMP okay.  ESR, CRP normal.  2023; CBC, CMP okay.  2024; CBC okay.  Creatinine 1.29, GFR greater than 60.   3/11/24: CMP ok. CBC ok. Hep C and B negative. Quantiferon tb negative.   2024; creatinine 1.37, GFR greater than 60. CBC okay.  08/15/2024; hemoglobin 13.5. Platelet count 402. ESR, CRP normal. CMP okay. Creatinine 1.12, GFR greater than 60.     Imagin: MRI of lumbar spine without contrast: Stable L4-L5 anterior and posterior fusion. Mild bulging at L5-S1 level.  10/2020: Chest X-ray normal. Normal X-ray of bilateral elbows, C spine, hips and SI joints. DJD of thoracic spine. Posterior hardware fixation at L4-5.     Assessment:       ICD-10-CM ICD-9-CM   1. HLA B27 (HLA B27 positive)  Z15.89 V84.89   2. Ankylosing spondylitis of lumbosacral region  M45.7 720.0   3. Inflammatory arthritis  M19.90 714.9   4. Pain in both lower extremities  M79.604 729.5    M79.605    5. Drug-induced immunodeficiency  D84.821 279.3    Z79.899 E947.9       Plan:     1. Ankylosing spondylitis of lumbosacral region and inflammatory arthritis: P/w features of inflammation back pain. Synovitis in bilateral elbows on exam, Shonna's test positive bilaterally. He has ankylosing spondylitis. Admits intermittent oral ulcers. Denies history of genital ulcers or nasal ulcers, inflammatory eye disease symptoms or inflammatory bowel disease symptoms.   - Non radiographic ankylosing spondylitis of lumbar spine. HLAB27 positive. Tried and failed Ibuprofen and Naproxen. Failed Cimzia, severe sinus issues with it. Patient felt better on Taltz, but insurance did not approve it. Cosentyx-did not help at all. Slowly lost response to Humira, its was not last lasting for full 14 days.  - continue Rinvoq 15 mg daily as patient reports symptomatic improvement, discussed the risks and benefits of medication with the patient.  No cardiac history, no history of DVT or PE or stroke.  -  Labs today and Q3 months while on Rinvoq.      2. Osteoarthritis of lumbar spine: He had seen Dr. Norris in the past. He saw Dr. Anatoliy hall, repeat back injections did not help, was told L5-S1 disc is bulging on nerves, he saw Dr Robbin Rodas in [place], was told no intervention needed at this time.   He is seeing Dr Hernandez Sanchez at [facility].      3. Sciatica of right side: C/w stretches and exercises.      4. High risk medication use:  - Persons with rheumatoid arthritis, lupus, psoriatic arthritis and other autoimmune diseases are at increased risk of cardiovascular disease including heart attack and stroke. We recommend that all patients with these conditions have annual health maintenance exams including lipid measurements, blood pressure measurements, and smoking cessation counseling when applicable at their primary care provider's office.   -Advised to stay up-to-date on age appropriate vaccinations and malignancy screening.    - Uptodate on COVID vaccines. Refused flu vaccine  and pneumonia vaccine.  - Singles vaccine 3/11/24 and on 8/15/24.      6. Low GFR with NSAID's: Advised to stay hydrated and avoid NSAID's when able. Will continue to follow.         Follow up in about 6 months (around 8/18/2025). In addition to their scheduled follow up, the patient has also been instructed to follow up on as needed basis.      Total time spent with patient and documentation is 40 minutes. All questions were answered to patient's satisfaction and patient verbalized understanding.

## 2025-02-24 LAB
GAMMA INTERFERON BACKGROUND BLD IA-ACNC: 0.04 IU/ML
M TB IFN-G BLD-IMP: NEGATIVE
M TB IFN-G CD4+ BCKGRND COR BLD-ACNC: 0 IU/ML
M TB IFN-G CD4+CD8+ BCKGRND COR BLD-ACNC: -0.01 IU/ML
MITOGEN IGNF BCKGRD COR BLD-ACNC: 8.57 IU/ML

## 2025-05-22 ENCOUNTER — TELEPHONE (OUTPATIENT)
Dept: RHEUMATOLOGY | Facility: CLINIC | Age: 55
End: 2025-05-22
Payer: COMMERCIAL

## 2025-05-22 NOTE — TELEPHONE ENCOUNTER
Communicated message form Dr Dukes. Patient plans on completing labs Monday 5.26.25 here at [facility].

## 2025-05-22 NOTE — TELEPHONE ENCOUNTER
----- Message from Anna Dukes MD sent at 2/18/2025 11:18 AM CST -----  Labs in 3 months, he will do them here, remind him. Labs are already ordered.

## 2025-05-26 ENCOUNTER — LAB VISIT (OUTPATIENT)
Dept: LAB | Facility: HOSPITAL | Age: 55
End: 2025-05-26
Attending: INTERNAL MEDICINE
Payer: COMMERCIAL

## 2025-05-26 DIAGNOSIS — Z15.89 HLA B27 (HLA B27 POSITIVE): ICD-10-CM

## 2025-05-26 LAB
ALBUMIN SERPL-MCNC: 4.2 G/DL (ref 3.5–5)
ALBUMIN/GLOB SERPL: 1.4 RATIO (ref 1.1–2)
ALP SERPL-CCNC: 48 UNIT/L (ref 40–150)
ALT SERPL-CCNC: 26 UNIT/L (ref 0–55)
ANION GAP SERPL CALC-SCNC: 6 MEQ/L
AST SERPL-CCNC: 20 UNIT/L (ref 11–45)
BASOPHILS # BLD AUTO: 0.03 X10(3)/MCL
BASOPHILS NFR BLD AUTO: 0.6 %
BILIRUB SERPL-MCNC: 1 MG/DL
BUN SERPL-MCNC: 21.2 MG/DL (ref 8.4–25.7)
CALCIUM SERPL-MCNC: 9.6 MG/DL (ref 8.4–10.2)
CHLORIDE SERPL-SCNC: 105 MMOL/L (ref 98–107)
CO2 SERPL-SCNC: 28 MMOL/L (ref 22–29)
CREAT SERPL-MCNC: 1.24 MG/DL (ref 0.72–1.25)
CREAT/UREA NIT SERPL: 17
EOSINOPHIL # BLD AUTO: 0.02 X10(3)/MCL (ref 0–0.9)
EOSINOPHIL NFR BLD AUTO: 0.4 %
ERYTHROCYTE [DISTWIDTH] IN BLOOD BY AUTOMATED COUNT: 12.3 % (ref 11.5–17)
GFR SERPLBLD CREATININE-BSD FMLA CKD-EPI: >60 ML/MIN/1.73/M2
GLOBULIN SER-MCNC: 2.9 GM/DL (ref 2.4–3.5)
GLUCOSE SERPL-MCNC: 98 MG/DL (ref 74–100)
HCT VFR BLD AUTO: 40.4 % (ref 42–52)
HGB BLD-MCNC: 13.9 G/DL (ref 14–18)
IMM GRANULOCYTES # BLD AUTO: 0.03 X10(3)/MCL (ref 0–0.04)
IMM GRANULOCYTES NFR BLD AUTO: 0.6 %
LYMPHOCYTES # BLD AUTO: 2.3 X10(3)/MCL (ref 0.6–4.6)
LYMPHOCYTES NFR BLD AUTO: 45.3 %
MCH RBC QN AUTO: 31.5 PG (ref 27–31)
MCHC RBC AUTO-ENTMCNC: 34.4 G/DL (ref 33–36)
MCV RBC AUTO: 91.6 FL (ref 80–94)
MONOCYTES # BLD AUTO: 0.51 X10(3)/MCL (ref 0.1–1.3)
MONOCYTES NFR BLD AUTO: 10 %
NEUTROPHILS # BLD AUTO: 2.19 X10(3)/MCL (ref 2.1–9.2)
NEUTROPHILS NFR BLD AUTO: 43.1 %
NRBC BLD AUTO-RTO: 0 %
PLATELET # BLD AUTO: 371 X10(3)/MCL (ref 130–400)
PMV BLD AUTO: 8.9 FL (ref 7.4–10.4)
POTASSIUM SERPL-SCNC: 4.5 MMOL/L (ref 3.5–5.1)
PROT SERPL-MCNC: 7.1 GM/DL (ref 6.4–8.3)
RBC # BLD AUTO: 4.41 X10(6)/MCL (ref 4.7–6.1)
SODIUM SERPL-SCNC: 139 MMOL/L (ref 136–145)
WBC # BLD AUTO: 5.08 X10(3)/MCL (ref 4.5–11.5)

## 2025-05-26 PROCEDURE — 36415 COLL VENOUS BLD VENIPUNCTURE: CPT

## 2025-05-26 PROCEDURE — 85025 COMPLETE CBC W/AUTO DIFF WBC: CPT

## 2025-05-26 PROCEDURE — 80053 COMPREHEN METABOLIC PANEL: CPT

## 2025-08-18 ENCOUNTER — OFFICE VISIT (OUTPATIENT)
Dept: RHEUMATOLOGY | Facility: CLINIC | Age: 55
End: 2025-08-18
Payer: COMMERCIAL

## 2025-08-18 ENCOUNTER — LAB VISIT (OUTPATIENT)
Dept: LAB | Facility: HOSPITAL | Age: 55
End: 2025-08-18
Attending: INTERNAL MEDICINE
Payer: COMMERCIAL

## 2025-08-18 VITALS
SYSTOLIC BLOOD PRESSURE: 116 MMHG | HEIGHT: 68 IN | BODY MASS INDEX: 34.07 KG/M2 | TEMPERATURE: 98 F | WEIGHT: 224.81 LBS | DIASTOLIC BLOOD PRESSURE: 79 MMHG | HEART RATE: 79 BPM | OXYGEN SATURATION: 97 % | RESPIRATION RATE: 16 BRPM

## 2025-08-18 DIAGNOSIS — N18.2 STAGE 2 CHRONIC KIDNEY DISEASE: ICD-10-CM

## 2025-08-18 DIAGNOSIS — D70.4 CYCLICAL NEUTROPENIA: ICD-10-CM

## 2025-08-18 DIAGNOSIS — M79.604 PAIN IN BOTH LOWER EXTREMITIES: ICD-10-CM

## 2025-08-18 DIAGNOSIS — Z15.89 HLA B27 (HLA B27 POSITIVE): Primary | ICD-10-CM

## 2025-08-18 DIAGNOSIS — M47.816 OSTEOARTHRITIS OF LUMBAR SPINE, UNSPECIFIED SPINAL OSTEOARTHRITIS COMPLICATION STATUS: ICD-10-CM

## 2025-08-18 DIAGNOSIS — M54.31 SCIATICA OF RIGHT SIDE: ICD-10-CM

## 2025-08-18 DIAGNOSIS — M45.7 ANKYLOSING SPONDYLITIS OF LUMBOSACRAL REGION: ICD-10-CM

## 2025-08-18 DIAGNOSIS — Z79.899 DRUG-INDUCED IMMUNODEFICIENCY: ICD-10-CM

## 2025-08-18 DIAGNOSIS — D84.821 DRUG-INDUCED IMMUNODEFICIENCY: ICD-10-CM

## 2025-08-18 DIAGNOSIS — M19.90 INFLAMMATORY ARTHRITIS: ICD-10-CM

## 2025-08-18 DIAGNOSIS — M79.605 PAIN IN BOTH LOWER EXTREMITIES: ICD-10-CM

## 2025-08-18 DIAGNOSIS — Z15.89 HLA B27 (HLA B27 POSITIVE): ICD-10-CM

## 2025-08-18 LAB
ALBUMIN SERPL-MCNC: 4.1 G/DL (ref 3.5–5)
ALBUMIN/GLOB SERPL: 1.4 RATIO (ref 1.1–2)
ALP SERPL-CCNC: 47 UNIT/L (ref 40–150)
ALT SERPL-CCNC: 24 UNIT/L (ref 0–55)
ANION GAP SERPL CALC-SCNC: 7 MEQ/L
AST SERPL-CCNC: 19 UNIT/L (ref 11–45)
BASOPHILS # BLD AUTO: 0.02 X10(3)/MCL
BASOPHILS NFR BLD AUTO: 0.5 %
BILIRUB SERPL-MCNC: 1 MG/DL
BUN SERPL-MCNC: 14.3 MG/DL (ref 8.4–25.7)
CALCIUM SERPL-MCNC: 9.4 MG/DL (ref 8.4–10.2)
CHLORIDE SERPL-SCNC: 108 MMOL/L (ref 98–107)
CO2 SERPL-SCNC: 27 MMOL/L (ref 22–29)
CREAT SERPL-MCNC: 1.18 MG/DL (ref 0.72–1.25)
CREAT/UREA NIT SERPL: 12
EOSINOPHIL # BLD AUTO: 0.01 X10(3)/MCL (ref 0–0.9)
EOSINOPHIL NFR BLD AUTO: 0.3 %
ERYTHROCYTE [DISTWIDTH] IN BLOOD BY AUTOMATED COUNT: 12.7 % (ref 11.5–17)
GFR SERPLBLD CREATININE-BSD FMLA CKD-EPI: >60 ML/MIN/1.73/M2
GLOBULIN SER-MCNC: 3 GM/DL (ref 2.4–3.5)
GLUCOSE SERPL-MCNC: 91 MG/DL (ref 74–100)
HCT VFR BLD AUTO: 37.8 % (ref 42–52)
HGB BLD-MCNC: 13.3 G/DL (ref 14–18)
IMM GRANULOCYTES # BLD AUTO: 0.02 X10(3)/MCL (ref 0–0.04)
IMM GRANULOCYTES NFR BLD AUTO: 0.5 %
LYMPHOCYTES # BLD AUTO: 1.67 X10(3)/MCL (ref 0.6–4.6)
LYMPHOCYTES NFR BLD AUTO: 41.9 %
MCH RBC QN AUTO: 31.8 PG (ref 27–31)
MCHC RBC AUTO-ENTMCNC: 35.2 G/DL (ref 33–36)
MCV RBC AUTO: 90.4 FL (ref 80–94)
MONOCYTES # BLD AUTO: 0.35 X10(3)/MCL (ref 0.1–1.3)
MONOCYTES NFR BLD AUTO: 8.8 %
NEUTROPHILS # BLD AUTO: 1.92 X10(3)/MCL (ref 2.1–9.2)
NEUTROPHILS NFR BLD AUTO: 48 %
NRBC BLD AUTO-RTO: 0 %
PLATELET # BLD AUTO: 360 X10(3)/MCL (ref 130–400)
PMV BLD AUTO: 9.4 FL (ref 7.4–10.4)
POTASSIUM SERPL-SCNC: 4.1 MMOL/L (ref 3.5–5.1)
PROT SERPL-MCNC: 7.1 GM/DL (ref 6.4–8.3)
RBC # BLD AUTO: 4.18 X10(6)/MCL (ref 4.7–6.1)
SODIUM SERPL-SCNC: 142 MMOL/L (ref 136–145)
WBC # BLD AUTO: 3.99 X10(3)/MCL (ref 4.5–11.5)

## 2025-08-18 PROCEDURE — 36415 COLL VENOUS BLD VENIPUNCTURE: CPT

## 2025-08-18 PROCEDURE — 3078F DIAST BP <80 MM HG: CPT | Mod: CPTII,,, | Performed by: INTERNAL MEDICINE

## 2025-08-18 PROCEDURE — 80053 COMPREHEN METABOLIC PANEL: CPT

## 2025-08-18 PROCEDURE — 99214 OFFICE O/P EST MOD 30 MIN: CPT | Mod: S$PBB,,, | Performed by: INTERNAL MEDICINE

## 2025-08-18 PROCEDURE — 3074F SYST BP LT 130 MM HG: CPT | Mod: CPTII,,, | Performed by: INTERNAL MEDICINE

## 2025-08-18 PROCEDURE — 1159F MED LIST DOCD IN RCRD: CPT | Mod: CPTII,,, | Performed by: INTERNAL MEDICINE

## 2025-08-18 PROCEDURE — 85025 COMPLETE CBC W/AUTO DIFF WBC: CPT

## 2025-08-18 PROCEDURE — 3008F BODY MASS INDEX DOCD: CPT | Mod: CPTII,,, | Performed by: INTERNAL MEDICINE

## 2025-08-18 PROCEDURE — 99214 OFFICE O/P EST MOD 30 MIN: CPT | Mod: PBBFAC | Performed by: INTERNAL MEDICINE

## 2025-08-18 PROCEDURE — G2211 COMPLEX E/M VISIT ADD ON: HCPCS | Mod: S$PBB,,, | Performed by: INTERNAL MEDICINE

## 2025-08-18 RX ORDER — MELOXICAM 15 MG/1
15 TABLET ORAL DAILY PRN
Qty: 30 TABLET | Refills: 1 | Status: SHIPPED | OUTPATIENT
Start: 2025-08-18

## 2025-08-18 RX ORDER — MELOXICAM 15 MG/1
15 TABLET ORAL
COMMUNITY
End: 2025-08-18 | Stop reason: SDUPTHER

## 2025-08-18 RX ORDER — UPADACITINIB 15 MG/1
15 TABLET, EXTENDED RELEASE ORAL DAILY
Qty: 30 TABLET | Refills: 11 | Status: SHIPPED | OUTPATIENT
Start: 2025-08-18

## 2025-08-18 RX ORDER — UPADACITINIB 15 MG/1
15 TABLET, EXTENDED RELEASE ORAL DAILY
Qty: 30 TABLET | Refills: 11 | Status: SHIPPED | OUTPATIENT
Start: 2025-08-18 | End: 2025-08-18

## 2025-08-18 RX ORDER — GABAPENTIN 300 MG/1
600 CAPSULE ORAL DAILY
Qty: 60 CAPSULE | Refills: 6 | Status: SHIPPED | OUTPATIENT
Start: 2025-08-18